# Patient Record
Sex: MALE | Race: WHITE | NOT HISPANIC OR LATINO | Employment: OTHER | URBAN - METROPOLITAN AREA
[De-identification: names, ages, dates, MRNs, and addresses within clinical notes are randomized per-mention and may not be internally consistent; named-entity substitution may affect disease eponyms.]

---

## 2018-12-30 ENCOUNTER — OFFICE VISIT - HEALTHEAST (OUTPATIENT)
Dept: FAMILY MEDICINE | Facility: CLINIC | Age: 33
End: 2018-12-30

## 2018-12-30 DIAGNOSIS — J20.9 ACUTE BRONCHITIS WITH SYMPTOMS > 10 DAYS: ICD-10-CM

## 2018-12-30 DIAGNOSIS — R09.81 NASAL CONGESTION: ICD-10-CM

## 2018-12-30 DIAGNOSIS — J06.9 UPPER RESPIRATORY TRACT INFECTION, UNSPECIFIED TYPE: ICD-10-CM

## 2019-07-02 ENCOUNTER — OFFICE VISIT - HEALTHEAST (OUTPATIENT)
Dept: FAMILY MEDICINE | Facility: CLINIC | Age: 34
End: 2019-07-02

## 2019-07-02 ENCOUNTER — COMMUNICATION - HEALTHEAST (OUTPATIENT)
Dept: TELEHEALTH | Facility: CLINIC | Age: 34
End: 2019-07-02

## 2019-07-02 DIAGNOSIS — R07.81 RIB PAIN ON RIGHT SIDE: ICD-10-CM

## 2019-07-02 DIAGNOSIS — M94.0 COSTOCHONDRITIS: ICD-10-CM

## 2019-11-04 ENCOUNTER — HEALTH MAINTENANCE LETTER (OUTPATIENT)
Age: 34
End: 2019-11-04

## 2020-02-21 ENCOUNTER — OFFICE VISIT (OUTPATIENT)
Dept: URGENT CARE | Facility: URGENT CARE | Age: 35
End: 2020-02-21

## 2020-02-21 VITALS
TEMPERATURE: 97.6 F | RESPIRATION RATE: 18 BRPM | SYSTOLIC BLOOD PRESSURE: 132 MMHG | HEART RATE: 81 BPM | OXYGEN SATURATION: 97 % | DIASTOLIC BLOOD PRESSURE: 88 MMHG

## 2020-02-21 DIAGNOSIS — R42 DIZZINESS: Primary | ICD-10-CM

## 2020-02-22 NOTE — PROGRESS NOTES
Don presents with complaints of feeling dizzy and anxious on and off since Sunday.  Patient states he had drank heavy on Saturday and has had a few drinks since then, most recently yesterday -- 2 beers and one mixed drink.  Patient denies drug use and states he currently is dizzy.  Patient's aunt is present and can drive him to an ED.  He is hesitant as he does not have insurance.  He has declined an EKG here.

## 2020-03-12 ENCOUNTER — OFFICE VISIT - HEALTHEAST (OUTPATIENT)
Dept: INTERNAL MEDICINE | Facility: CLINIC | Age: 35
End: 2020-03-12

## 2020-03-12 ENCOUNTER — COMMUNICATION - HEALTHEAST (OUTPATIENT)
Dept: TELEHEALTH | Facility: CLINIC | Age: 35
End: 2020-03-12

## 2020-03-12 ENCOUNTER — COMMUNICATION - HEALTHEAST (OUTPATIENT)
Dept: INTERNAL MEDICINE | Facility: CLINIC | Age: 35
End: 2020-03-12

## 2020-03-12 DIAGNOSIS — Z00.00 ROUTINE GENERAL MEDICAL EXAMINATION AT A HEALTH CARE FACILITY: ICD-10-CM

## 2020-03-12 DIAGNOSIS — G47.9 SLEEP DISTURBANCE: ICD-10-CM

## 2020-03-12 DIAGNOSIS — R00.2 PALPITATIONS: ICD-10-CM

## 2020-03-12 DIAGNOSIS — F41.9 ANXIETY: ICD-10-CM

## 2020-03-12 DIAGNOSIS — K21.9 GASTROESOPHAGEAL REFLUX DISEASE WITHOUT ESOPHAGITIS: ICD-10-CM

## 2020-03-12 DIAGNOSIS — F10.10 EXCESSIVE DRINKING ALCOHOL: ICD-10-CM

## 2020-03-12 LAB
ALBUMIN SERPL-MCNC: 4.1 G/DL (ref 3.5–5)
ALP SERPL-CCNC: 51 U/L (ref 45–120)
ALT SERPL W P-5'-P-CCNC: 43 U/L (ref 0–45)
ANION GAP SERPL CALCULATED.3IONS-SCNC: 9 MMOL/L (ref 5–18)
AST SERPL W P-5'-P-CCNC: 38 U/L (ref 0–40)
ATRIAL RATE - MUSE: 57 BPM
BASOPHILS # BLD AUTO: 0 THOU/UL (ref 0–0.2)
BASOPHILS NFR BLD AUTO: 0 % (ref 0–2)
BILIRUB SERPL-MCNC: 0.7 MG/DL (ref 0–1)
BUN SERPL-MCNC: 15 MG/DL (ref 8–22)
CALCIUM SERPL-MCNC: 9.8 MG/DL (ref 8.5–10.5)
CHLORIDE BLD-SCNC: 103 MMOL/L (ref 98–107)
CHOLEST SERPL-MCNC: 164 MG/DL
CO2 SERPL-SCNC: 26 MMOL/L (ref 22–31)
CREAT SERPL-MCNC: 0.86 MG/DL (ref 0.7–1.3)
DIASTOLIC BLOOD PRESSURE - MUSE: NORMAL
EOSINOPHIL # BLD AUTO: 0.2 THOU/UL (ref 0–0.4)
EOSINOPHIL NFR BLD AUTO: 4 % (ref 0–6)
ERYTHROCYTE [DISTWIDTH] IN BLOOD BY AUTOMATED COUNT: 12.5 % (ref 11–14.5)
FASTING STATUS PATIENT QL REPORTED: YES
GFR SERPL CREATININE-BSD FRML MDRD: >60 ML/MIN/1.73M2
GLUCOSE BLD-MCNC: 89 MG/DL (ref 70–125)
HCT VFR BLD AUTO: 44.6 % (ref 40–54)
HDLC SERPL-MCNC: 71 MG/DL
HGB BLD-MCNC: 15.3 G/DL (ref 14–18)
INTERPRETATION ECG - MUSE: NORMAL
LDLC SERPL CALC-MCNC: 73 MG/DL
LYMPHOCYTES # BLD AUTO: 1.8 THOU/UL (ref 0.8–4.4)
LYMPHOCYTES NFR BLD AUTO: 33 % (ref 20–40)
MCH RBC QN AUTO: 32 PG (ref 27–34)
MCHC RBC AUTO-ENTMCNC: 34.3 G/DL (ref 32–36)
MCV RBC AUTO: 93 FL (ref 80–100)
MONOCYTES # BLD AUTO: 0.5 THOU/UL (ref 0–0.9)
MONOCYTES NFR BLD AUTO: 8 % (ref 2–10)
NEUTROPHILS # BLD AUTO: 2.9 THOU/UL (ref 2–7.7)
NEUTROPHILS NFR BLD AUTO: 54 % (ref 50–70)
P AXIS - MUSE: 9 DEGREES
PLATELET # BLD AUTO: 315 THOU/UL (ref 140–440)
PMV BLD AUTO: 7 FL (ref 7–10)
POTASSIUM BLD-SCNC: 4.3 MMOL/L (ref 3.5–5)
PR INTERVAL - MUSE: 164 MS
PROT SERPL-MCNC: 7.1 G/DL (ref 6–8)
QRS DURATION - MUSE: 90 MS
QT - MUSE: 404 MS
QTC - MUSE: 393 MS
R AXIS - MUSE: 31 DEGREES
RBC # BLD AUTO: 4.78 MILL/UL (ref 4.4–6.2)
SODIUM SERPL-SCNC: 138 MMOL/L (ref 136–145)
SYSTOLIC BLOOD PRESSURE - MUSE: NORMAL
T AXIS - MUSE: 34 DEGREES
TRIGL SERPL-MCNC: 102 MG/DL
TSH SERPL DL<=0.005 MIU/L-ACNC: 1.26 UIU/ML (ref 0.3–5)
VENTRICULAR RATE- MUSE: 57 BPM
WBC: 5.3 THOU/UL (ref 4–11)

## 2020-03-12 ASSESSMENT — ANXIETY QUESTIONNAIRES
1. FEELING NERVOUS, ANXIOUS, OR ON EDGE: SEVERAL DAYS
3. WORRYING TOO MUCH ABOUT DIFFERENT THINGS: SEVERAL DAYS
GAD7 TOTAL SCORE: 7
4. TROUBLE RELAXING: MORE THAN HALF THE DAYS
2. NOT BEING ABLE TO STOP OR CONTROL WORRYING: SEVERAL DAYS
IF YOU CHECKED OFF ANY PROBLEMS ON THIS QUESTIONNAIRE, HOW DIFFICULT HAVE THESE PROBLEMS MADE IT FOR YOU TO DO YOUR WORK, TAKE CARE OF THINGS AT HOME, OR GET ALONG WITH OTHER PEOPLE: SOMEWHAT DIFFICULT
5. BEING SO RESTLESS THAT IT IS HARD TO SIT STILL: SEVERAL DAYS
6. BECOMING EASILY ANNOYED OR IRRITABLE: NOT AT ALL
7. FEELING AFRAID AS IF SOMETHING AWFUL MIGHT HAPPEN: SEVERAL DAYS

## 2020-03-12 ASSESSMENT — MIFFLIN-ST. JEOR: SCORE: 1916.7

## 2020-03-12 ASSESSMENT — PATIENT HEALTH QUESTIONNAIRE - PHQ9: SUM OF ALL RESPONSES TO PHQ QUESTIONS 1-9: 9

## 2020-07-13 ENCOUNTER — VIRTUAL VISIT (OUTPATIENT)
Dept: FAMILY MEDICINE | Facility: OTHER | Age: 35
End: 2020-07-13
Payer: COMMERCIAL

## 2020-07-13 PROCEDURE — 99421 OL DIG E/M SVC 5-10 MIN: CPT | Performed by: FAMILY MEDICINE

## 2020-07-13 NOTE — PROGRESS NOTES
"Date: 2020 16:45:51  Clinician: Dejuan Guy  Clinician NPI: [id number]  Patient: ODILIA LACKEY  Patient : 1985  Patient Address: [facility] [address] [address]  Patient Phone: [phone]  Visit Protocol: URI  Patient Summary:  ODILIA is a 35 year old ( : 1985 ) male who initiated a Visit for COVID-19 (Coronavirus) evaluation and screening. When asked the question \"Please sign me up to receive news, health information and promotions from [employer].\", ODILIA responded \"No\".    ODILIA states his symptoms started 1-2 days ago.   His symptoms consist of wheezing, ageusia, rhinitis, malaise, a headache, chills, a sore throat, myalgia, anosmia, a cough, and nasal congestion. He is experiencing mild difficulty breathing with activities but can speak normally in full sentences. ODILIA also feels feverish but was unable to measure his temperature.   Symptom details     Nasal secretions: The color of his mucus is white, green, and yellow.    Cough: ODILIA coughs every 5-10 minutes and his cough is more bothersome at night. Phlegm comes into his throat when he coughs. He does not believe his cough is caused by post-nasal drip. The color of the phlegm is white, green, and yellow.     Sore throat: ODILIA reports having mild throat pain (1-3 on a 10 point pain scale), does not have exudate on his tonsils, and can swallow liquids. He is not sure if the lymph nodes in his neck are enlarged. A rash has not appeared on the skin since the sore throat started.     Wheezing: ODILIA has not ever been diagnosed with asthma. Additional wheezing details as reported by the patient (free text): Wheezing during normal resting activities.       Headache: He states the headache is moderate (4-6 on a 10 point pain scale).      ODILIA denies having nausea, teeth pain, diarrhea, vomiting, ear pain, and facial pain or pressure. He also denies having recent facial or sinus surgery in the past 60 days, taking antibiotic medication in the past " month, and having a sinus infection within the past year.   Precipitating events  ODILIA is not sure if he has been exposed to someone with strep throat. He has recently been exposed to someone with influenza. ODILIA has not been in close contact with any high risk individuals.   Pertinent COVID-19 (Coronavirus) information  In the past 14 days, ODILIA has not worked in a congregate living setting.   He does not work or volunteer as healthcare worker or a  and does not work or volunteer in a healthcare facility.   ODILIA also has not lived in a congregate living setting in the past 14 days. He lives with a healthcare worker.   ODILIA has not had a close contact with a laboratory-confirmed COVID-19 patient within 14 days of symptom onset.   Pertinent medical history  ODILIA does not need a return to work/school note.   Weight: 190 lbs   ODILIA does not smoke or use smokeless tobacco.   Additional information as reported by the patient (free text): I've been around large groups of people.   Weight: 190 lbs    MEDICATIONS: No current medications, ALLERGIES: NKDA  Clinician Response:  Dear ODILIA,   Your symptoms show that you may have coronavirus (COVID-19). This illness can cause fever, cough and trouble breathing. Many people get a mild case and get better on their own. Some people can get very sick.  What should I do?  We would like to test you for this virus.   1. Please call [phone] to schedule your visit. Explain that you were referred by [facility] to have a COVID-19 test. Be ready to share your [facility] visit ID number.  The following will serve as your written order for this COVID Test, ordered by me, for the indication of suspected COVID [Z20.828]: The test will be ordered in [employer], our electronic health record, after you are scheduled. It will show as ordered and authorized by Humble Cho MD.  Order: COVID-19 (Coronavirus) PCR for SYMPTOMATIC testing from [facility].      2. When it's time for your COVID test:   "Stay at least 6 feet away from others. (If someone will drive you to your test, stay in the backseat, as far away from the  as you can.)   Cover your mouth and nose with a mask, tissue or washcloth.  Go straight to the testing site. Don't make any stops on the way there or back.      3.Starting now: Stay home and away from others (self-isolate) until:   You've had no fever---and no medicine that reduces fever---for 3 full days (72 hours). And...   Your other symptoms have gotten better. For example, your cough or breathing has improved. And...   At least 10 days have passed since your symptoms started.       During this time, don't leave the house except for testing or medical care.   Stay in your own room, even for meals. Use your own bathroom if you can.   Stay away from others in your home. No hugging, kissing or shaking hands. No visitors.  Don't go to work, school or anywhere else.    Clean \"high touch\" surfaces often (doorknobs, counters, handles, etc.). Use a household cleaning spray or wipes. You'll find a full list of  on the EPA website: www.epa.gov/pesticide-registration/list-n-disinfectants-use-against-sars-cov-2.   Cover your mouth and nose with a mask, tissue or washcloth to avoid spreading germs.  Wash your hands and face often. Use soap and water.  Caregivers in these groups are at risk for severe illness due to COVID-19:  o People 65 years and older  o People who live in a nursing home or long-term care facility  o People with chronic disease (lung, heart, cancer, diabetes, kidney, liver, immunologic)  o People who have a weakened immune system, including those who:   Are in cancer treatment  Take medicine that weakens the immune system, such as corticosteroids  Had a bone marrow or organ transplant  Have an immune deficiency  Have poorly controlled HIV or AIDS  Are obese (body mass index of 40 or higher)  Smoke regularly   o Caregivers should wear gloves while washing dishes, handling " laundry and cleaning bedrooms and bathrooms.  o Use caution when washing and drying laundry: Don't shake dirty laundry, and use the warmest water setting that you can.  o For more tips, go to www.cdc.gov/coronavirus/2019-ncov/downloads/10Things.pdf.    4.Sign up for [employer]. We know it's scary to hear that you might have COVID-19. We want to track your symptoms to make sure you're okay over the next 2 weeks. Please look for an email from [employer]---this is a free, online program that we'll use to keep in touch. To sign up, follow the link in the email. Learn more at [email or web address]  How can I take care of myself?   Get lots of rest. Drink extra fluids (unless a doctor has told you not to).   Take Tylenol (acetaminophen) for fever or pain. If you have liver or kidney problems, ask your family doctor if it's okay to take Tylenol.   Adults can take either:    650 mg (two 325 mg pills) every 4 to 6 hours, or...   1,000 mg (two 500 mg pills) every 8 hours as needed.    Note: Don't take more than 3,000 mg in one day. Acetaminophen is found in many medicines (both prescribed and over-the-counter medicines). Read all labels to be sure you don't take too much.   For children, check the Tylenol bottle for the right dose. The dose is based on the child's age or weight.    If you have other health problems (like cancer, heart failure, an organ transplant or severe kidney disease): Call your specialty clinic if you don't feel better in the next 2 days.       Know when to call 911. Emergency warning signs include:    Trouble breathing or shortness of breath Pain or pressure in the chest that doesn't go away Feeling confused like you haven't felt before, or not being able to wake up Bluish-colored lips or face.  Where can I get more information?    [facility] -- About COVID-19: [email or web address]   CDC -- What to Do If You're Sick:  www.cdc.gov/coronavirus/2019-ncov/about/steps-when-sick.html   CDC -- Ending Home Isolation: www.cdc.gov/coronavirus/2019-ncov/hcp/disposition-in-home-patients.html   [facility] -- Caring for Someone: www.cdc.gov/coronavirus/2019-ncov/if-you-are-sick/care-for-someone.html   [facility] -- Interim Guidance for Hospital Discharge to Home: www.[facility].[place]./diseases/coronavirus/hcp/hospdischarge.pdf   [facility] clinical trials (COVID-19 research studies): clinicalaffairs.[facility]-clinical-trials    Below are the COVID-19 hotlines at the [place] Department of Health ([facility]). Interpreters are available.    For health questions: Call 834-491-6444 or 1-425.867.7760 (7 a.m. to 7 p.m.) For questions about schools and childcare: Call 008-071-7638 or 1-607.692.7472 (7 a.m. to 7 p.m.)    Diagnosis: Cough  Diagnosis ICD: R05

## 2020-07-22 DIAGNOSIS — Z20.822 COVID-19 RULED OUT: Primary | ICD-10-CM

## 2020-07-22 PROCEDURE — U0003 INFECTIOUS AGENT DETECTION BY NUCLEIC ACID (DNA OR RNA); SEVERE ACUTE RESPIRATORY SYNDROME CORONAVIRUS 2 (SARS-COV-2) (CORONAVIRUS DISEASE [COVID-19]), AMPLIFIED PROBE TECHNIQUE, MAKING USE OF HIGH THROUGHPUT TECHNOLOGIES AS DESCRIBED BY CMS-2020-01-R: HCPCS | Performed by: FAMILY MEDICINE

## 2020-07-23 LAB
SARS-COV-2 RNA SPEC QL NAA+PROBE: NOT DETECTED
SPECIMEN SOURCE: NORMAL

## 2020-10-02 ENCOUNTER — COMMUNICATION - HEALTHEAST (OUTPATIENT)
Dept: INTERNAL MEDICINE | Facility: CLINIC | Age: 35
End: 2020-10-02

## 2020-10-05 ENCOUNTER — OFFICE VISIT - HEALTHEAST (OUTPATIENT)
Dept: FAMILY MEDICINE | Facility: CLINIC | Age: 35
End: 2020-10-05

## 2020-10-05 DIAGNOSIS — M79.605 PAIN IN LEFT LEG: ICD-10-CM

## 2020-10-05 DIAGNOSIS — M79.602 LEFT ARM PAIN: ICD-10-CM

## 2020-10-05 LAB
ALBUMIN SERPL-MCNC: 4.3 G/DL (ref 3.5–5)
ALP SERPL-CCNC: 51 U/L (ref 45–120)
ALT SERPL W P-5'-P-CCNC: 19 U/L (ref 0–45)
ANION GAP SERPL CALCULATED.3IONS-SCNC: 11 MMOL/L (ref 5–18)
AST SERPL W P-5'-P-CCNC: 17 U/L (ref 0–40)
BILIRUB SERPL-MCNC: 0.7 MG/DL (ref 0–1)
BUN SERPL-MCNC: 20 MG/DL (ref 8–22)
C REACTIVE PROTEIN LHE: <0.1 MG/DL (ref 0–0.8)
CALCIUM SERPL-MCNC: 9.6 MG/DL (ref 8.5–10.5)
CHLORIDE BLD-SCNC: 107 MMOL/L (ref 98–107)
CO2 SERPL-SCNC: 22 MMOL/L (ref 22–31)
CREAT SERPL-MCNC: 0.94 MG/DL (ref 0.7–1.3)
ERYTHROCYTE [DISTWIDTH] IN BLOOD BY AUTOMATED COUNT: 11.8 % (ref 11–14.5)
ERYTHROCYTE [SEDIMENTATION RATE] IN BLOOD BY WESTERGREN METHOD: 3 MM/HR (ref 0–15)
GFR SERPL CREATININE-BSD FRML MDRD: >60 ML/MIN/1.73M2
GLUCOSE BLD-MCNC: 100 MG/DL (ref 70–125)
HCT VFR BLD AUTO: 46.8 % (ref 40–54)
HGB BLD-MCNC: 15.5 G/DL (ref 14–18)
MCH RBC QN AUTO: 31.6 PG (ref 27–34)
MCHC RBC AUTO-ENTMCNC: 33.2 G/DL (ref 32–36)
MCV RBC AUTO: 95 FL (ref 80–100)
PLATELET # BLD AUTO: 299 THOU/UL (ref 140–440)
PMV BLD AUTO: 7.3 FL (ref 7–10)
POTASSIUM BLD-SCNC: 4.3 MMOL/L (ref 3.5–5)
PROT SERPL-MCNC: 7 G/DL (ref 6–8)
RBC # BLD AUTO: 4.92 MILL/UL (ref 4.4–6.2)
RHEUMATOID FACT SERPL-ACNC: <15 IU/ML (ref 0–30)
SODIUM SERPL-SCNC: 140 MMOL/L (ref 136–145)
WBC: 5.4 THOU/UL (ref 4–11)

## 2020-10-06 ENCOUNTER — COMMUNICATION - HEALTHEAST (OUTPATIENT)
Dept: FAMILY MEDICINE | Facility: CLINIC | Age: 35
End: 2020-10-06

## 2020-10-06 ENCOUNTER — AMBULATORY - HEALTHEAST (OUTPATIENT)
Dept: FAMILY MEDICINE | Facility: CLINIC | Age: 35
End: 2020-10-06

## 2020-10-06 DIAGNOSIS — M79.605 PAIN IN LEFT LEG: ICD-10-CM

## 2020-10-06 DIAGNOSIS — M79.602 LEFT ARM PAIN: ICD-10-CM

## 2020-10-06 LAB
25(OH)D3 SERPL-MCNC: 20.6 NG/ML (ref 30–80)
B BURGDOR IGG+IGM SER QL: 0.29 INDEX VALUE

## 2020-11-02 ENCOUNTER — RECORDS - HEALTHEAST (OUTPATIENT)
Dept: INTERNAL MEDICINE | Facility: CLINIC | Age: 35
End: 2020-11-02

## 2020-11-02 ENCOUNTER — COMMUNICATION - HEALTHEAST (OUTPATIENT)
Dept: FAMILY MEDICINE | Facility: CLINIC | Age: 35
End: 2020-11-02

## 2020-11-17 ENCOUNTER — OFFICE VISIT - HEALTHEAST (OUTPATIENT)
Dept: FAMILY MEDICINE | Facility: CLINIC | Age: 35
End: 2020-11-17

## 2020-11-17 DIAGNOSIS — M25.50 MULTIPLE JOINT PAIN: ICD-10-CM

## 2020-11-17 DIAGNOSIS — Z11.3 SCREEN FOR STD (SEXUALLY TRANSMITTED DISEASE): ICD-10-CM

## 2020-11-17 LAB
C REACTIVE PROTEIN LHE: <0.1 MG/DL (ref 0–0.8)
ERYTHROCYTE [SEDIMENTATION RATE] IN BLOOD BY WESTERGREN METHOD: 3 MM/HR (ref 0–15)
HIV 1+2 AB+HIV1 P24 AG SERPL QL IA: NEGATIVE
URATE SERPL-MCNC: 6.5 MG/DL (ref 3–8)

## 2020-11-18 LAB
B BURGDOR IGG+IGM SER QL: 0.33 INDEX VALUE
COVID-19 ANTIBODY IGG: NEGATIVE
HBV SURFACE AG SERPL QL IA: NEGATIVE
HCV AB SERPL QL IA: NEGATIVE

## 2020-11-19 LAB
A PHAGOCYTOPH IGG TITR SER IF: NORMAL {TITER}
A PHAGOCYTOPH IGM TITR SER IF: NORMAL {TITER}
B MICROTI IGG TITR SER: NORMAL {TITER}

## 2020-11-20 ENCOUNTER — COMMUNICATION - HEALTHEAST (OUTPATIENT)
Dept: FAMILY MEDICINE | Facility: CLINIC | Age: 35
End: 2020-11-20

## 2020-11-20 LAB
C TRACH DNA SPEC QL PROBE+SIG AMP: NEGATIVE
N GONORRHOEA DNA SPEC QL NAA+PROBE: NEGATIVE

## 2020-11-22 ENCOUNTER — HEALTH MAINTENANCE LETTER (OUTPATIENT)
Age: 35
End: 2020-11-22

## 2020-11-24 ENCOUNTER — OFFICE VISIT - HEALTHEAST (OUTPATIENT)
Dept: RHEUMATOLOGY | Facility: CLINIC | Age: 35
End: 2020-11-24

## 2020-11-24 DIAGNOSIS — G89.29 CHRONIC PAIN OF MULTIPLE JOINTS: ICD-10-CM

## 2020-11-24 DIAGNOSIS — M25.50 CHRONIC PAIN OF MULTIPLE JOINTS: ICD-10-CM

## 2020-11-24 DIAGNOSIS — G89.29 CHRONIC UPPER BACK PAIN: ICD-10-CM

## 2020-11-24 DIAGNOSIS — R20.2 PARESTHESIA OF UPPER EXTREMITY: ICD-10-CM

## 2020-11-24 DIAGNOSIS — M54.9 CHRONIC UPPER BACK PAIN: ICD-10-CM

## 2020-11-24 DIAGNOSIS — M25.512 CHRONIC LEFT SHOULDER PAIN: ICD-10-CM

## 2020-11-24 DIAGNOSIS — Z78.9 REGULAR ALCOHOL CONSUMPTION: ICD-10-CM

## 2020-11-24 DIAGNOSIS — G89.29 CHRONIC LEFT SHOULDER PAIN: ICD-10-CM

## 2020-12-01 ENCOUNTER — COMMUNICATION - HEALTHEAST (OUTPATIENT)
Dept: RHEUMATOLOGY | Facility: CLINIC | Age: 35
End: 2020-12-01

## 2021-05-18 ENCOUNTER — AMBULATORY - HEALTHEAST (OUTPATIENT)
Dept: LAB | Facility: CLINIC | Age: 36
End: 2021-05-18

## 2021-05-18 ENCOUNTER — RECORDS - HEALTHEAST (OUTPATIENT)
Dept: ADMINISTRATIVE | Facility: OTHER | Age: 36
End: 2021-05-18

## 2021-05-18 DIAGNOSIS — R20.2 PARESTHESIA OF UPPER EXTREMITY: ICD-10-CM

## 2021-05-18 DIAGNOSIS — R53.83 FATIGUE: ICD-10-CM

## 2021-05-18 DIAGNOSIS — M25.562 ARTHRALGIA OF BOTH KNEES: ICD-10-CM

## 2021-05-18 DIAGNOSIS — Z78.9 REGULAR ALCOHOL CONSUMPTION: ICD-10-CM

## 2021-05-18 DIAGNOSIS — M25.561 ARTHRALGIA OF BOTH KNEES: ICD-10-CM

## 2021-05-18 DIAGNOSIS — M25.50 CHRONIC PAIN OF MULTIPLE JOINTS: ICD-10-CM

## 2021-05-18 DIAGNOSIS — M54.9 CHRONIC UPPER BACK PAIN: ICD-10-CM

## 2021-05-18 DIAGNOSIS — G89.29 CHRONIC PAIN OF MULTIPLE JOINTS: ICD-10-CM

## 2021-05-18 DIAGNOSIS — G89.29 CHRONIC UPPER BACK PAIN: ICD-10-CM

## 2021-05-18 DIAGNOSIS — M79.10 MYALGIA: ICD-10-CM

## 2021-05-19 LAB
25(OH)D3 SERPL-MCNC: 35.6 NG/ML (ref 30–80)
B BURGDOR IGG+IGM SER QL: 0.3 INDEX VALUE

## 2021-05-20 ENCOUNTER — AMBULATORY - HEALTHEAST (OUTPATIENT)
Dept: NURSING | Facility: CLINIC | Age: 36
End: 2021-05-20

## 2021-05-20 LAB
EBV EA-D IGG SER-ACNC: <0.2 AI (ref 0–0.8)
EBV NA IGG SER IA-ACNC: >8 AI (ref 0–0.8)
EBV VCA IGG SER IA-ACNC: 2.3 AI (ref 0–0.8)

## 2021-05-25 ENCOUNTER — RECORDS - HEALTHEAST (OUTPATIENT)
Dept: ADMINISTRATIVE | Facility: CLINIC | Age: 36
End: 2021-05-25

## 2021-05-26 ASSESSMENT — PATIENT HEALTH QUESTIONNAIRE - PHQ9: SUM OF ALL RESPONSES TO PHQ QUESTIONS 1-9: 9

## 2021-05-28 ENCOUNTER — RECORDS - HEALTHEAST (OUTPATIENT)
Dept: ADMINISTRATIVE | Facility: CLINIC | Age: 36
End: 2021-05-28

## 2021-05-28 ASSESSMENT — ANXIETY QUESTIONNAIRES: GAD7 TOTAL SCORE: 7

## 2021-06-02 VITALS — BODY MASS INDEX: 24.37 KG/M2 | WEIGHT: 189.8 LBS

## 2021-06-03 VITALS — WEIGHT: 190 LBS | BODY MASS INDEX: 24.39 KG/M2

## 2021-06-04 VITALS
DIASTOLIC BLOOD PRESSURE: 79 MMHG | TEMPERATURE: 98 F | OXYGEN SATURATION: 97 % | BODY MASS INDEX: 24.92 KG/M2 | HEART RATE: 68 BPM | SYSTOLIC BLOOD PRESSURE: 128 MMHG | WEIGHT: 200.4 LBS | HEIGHT: 75 IN

## 2021-06-05 VITALS
HEART RATE: 61 BPM | SYSTOLIC BLOOD PRESSURE: 140 MMHG | WEIGHT: 196.8 LBS | DIASTOLIC BLOOD PRESSURE: 79 MMHG | BODY MASS INDEX: 24.93 KG/M2

## 2021-06-05 VITALS
HEART RATE: 71 BPM | DIASTOLIC BLOOD PRESSURE: 73 MMHG | WEIGHT: 195.9 LBS | BODY MASS INDEX: 24.82 KG/M2 | SYSTOLIC BLOOD PRESSURE: 116 MMHG

## 2021-06-06 NOTE — PROGRESS NOTES
Office Visit - New Patient   Doyle Beaver   35 y.o.  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  Issues are palpitations occurring on most days, symptoms suggestive of premature ventricular contractions which I believe are benign, without any red flag symptoms, has exacerbating factors of sleep disturbance, possibly excessive alcohol consumption.  Gastroesophageal reflux symptoms, without dysphagia.  Situational anxiety, life and career stressors, episodes of what sound like possible panic attacks.  Excessive alcohol consumption.  Sleep disturbance, related to anxiety and alcohol.  Perianal skin irritation and fissure.  General preventive care.    He is fasting this morning, so we will check comprehensive metabolic panel, blood cell counts, lipid profile, thyroid cascade.  Check 12-lead EKG.     Going issue by issue:    Palpitations occurring on most days, symptoms suggestive of premature ventricular contractions which I believe are benign, without any red flag symptoms, has exacerbating factors of sleep disturbance, possibly excessive alcohol consumption.  I told him that I am pretty sure what he is experiencing are benign PVCs, which I told him are not dangerous.  His cardiac physical exam is completely normal.  I told him that PVCs can be triggered and exacerbated by stress, sleep deprivation, dehydration or electrolyte disturbance, excessive caffeine, excessive alcohol.  We will get a baseline twelve-lead resting EKG today.    He asked about getting a 24-hour Holter monitor study.  I told him that we could do that, or he could try an attempt at lifestyle modification first, and see if his symptoms get better.  He agrees to hold off on the Holter monitor study for now.  I want him to reduce  and preferably completely stop alcohol consumption.  We need to address his anxiety and sleep problems, possibly using medication.  I want him to eat a healthy balanced diet, start regular exercise, which will also help with sleep.  He needs to continue avoiding caffeine.    LATER: His twelve-lead EKG is completely normal.    Gastroesophageal reflux symptoms, without dysphagia.  He describes quite characteristic gastroesophageal reflux symptoms, has bad habits in terms of eating late suppers and then lying down.  I told him he first needs to modify his habits, leaving at least 90 minutes after eating before lying down.  I told him he could also use an over-the-counter acid suppressor such as Prilosec, Zantac, or Pepcid.  I told him that those medications reduce acid production, but do not actually stop the reflux.  However, they can make him more comfortable, and if it helps his sleep, it is worth doing.    Situational anxiety, life and career stressors, episodes of what sound like possible panic attacks.  The first thing that he needs to do is stop the alcohol, because  alcohol interferes with sleep, and chronic sleep deprivation will make his anxiety symptoms worse. I told him that there are medication options we could consider for anxiety, such as the medication escitalopram (known as Lexapro).  I suggested he do some online research about those medications, and we can certainly start them, even without an office visit.  He would like to avoid medication for now.    Excessive alcohol consumption.      Sleep disturbance, related to anxiety and alcohol.  To improve sleep, I told him that regular exercise will help, also healthy diet, alcohol avoidance, caffeine avoidance, and he might also consider some mindfulness practices to help him relax before bedtime.  Examples are meditation, yoga, and a bedtime routine.  I reminded him that the bedroom environment needs to be cool, dark, and quiet.  Avoid screen time or  reading in bed.    Perianal skin irritation and fissure.  To care for the perianal skin, I suggested using moisten toilet paper or even baby wipes.  For bathing, use a mild soap such as Dove, pat the skin dry, could also use a hair dryer on air dry (no heat).  He does have a history of constipation and needs to maintain soft stools.  Hard stools can be irritating.    General preventive care.  We will include in his lab work today routine screening preventive tests, including lipid profile.  He can start colon cancer screening at age 50.  I asked him to check his immunization records.  He is an avid outdoorsman, and needs to keep his tetanus status current, meaning a booster every 10 years.         Chief Complaint   Chief Complaint   Patient presents with     Establish Care     Concerns regarding heart palpitations, blood in stool, acid reflux, anxiety        History of Present Illness   This 35 y.o. old  man who is historically been pretty healthy, works for a [employer] business, comes in to establish primary care, recently got his health insurance activated.  Issues are palpitations occurring on most days, symptoms suggestive of premature ventricular contractions which I believe are benign, without any red flag symptoms, has exacerbating factors of sleep disturbance, possibly excessive alcohol consumption.  Gastroesophageal reflux symptoms, without dysphagia.  Situational anxiety, life and career stressors, episodes of what sound like possible panic attacks.  Excessive alcohol consumption.  Sleep disturbance, related to anxiety and alcohol.  Perianal skin irritation and fissure.  General preventive care.    Works in [employer] business    Palpitations: feels a jump in chest, one jolt.  Feels a flush  Every day: between once to up to about 5 times in a day  Alcohol--- makes worse  Reduced cafine  No chest pain associated  No shortness of breath  No Fainting  No known history cardiac disease  Teenager tolf about  murmur    Chest pain-- GERD  Independent of palpitation  Sharp pains  Maybe related to meals  Worse after meals  Worse lying down  Swallowing is OK  Tried acid pills ago helped, not tried recently  Tends to eat late    Anxiety  Stressful couple of years, months  Breakup with girlfriend  Panic attacks-- hot flashes, can't sleep  Function on job OK, but struggling    EtOH every day 3 or 4 drinks-- beer whisky    Sleep terrible at least last month  Wakes up, can't go back to sleep  Caffeine he stopped few weeks ago  Alcohol--- makes worse  He denies excessive snoring or BERTHA symptoms    Yeast skin infection- perianal  January 2020  Antifungal cream helped  Hurts to BM  Itching has resolved  BMs are irregular.    Kayleigh 7-2-19  presents today complaining of 1 month history of right-sided rib pain.     03/05/2018 Office Visit [facility]    [address] [place] [address] [phone]   Dank Vivar MD    [address] [place] [address] [phone] (Fax)   Periumbilical abdominal pain (Primary Dx);   Constipation, unspecified constipation      There is no immunization history on file for this patient.    Wt Readings from Last 3 Encounters:   03/12/20 200 lb 6.4 oz (90.9 kg)   07/02/19 190 lb (86.2 kg)   12/30/18 189 lb 12.8 oz (86.1 kg)     BP Readings from Last 3 Encounters:   03/12/20 128/79   07/02/19 124/77   12/30/18 118/74       Lab Results   Component Value Date    WBC 7.4 10/30/2018    HGB 13.8 (L) 10/30/2018    HCT 41.0 10/30/2018     10/30/2018     10/30/2018    K 3.6 10/30/2018     10/30/2018    CREATININE 0.87 10/30/2018    BUN 17 10/30/2018    CO2 25 10/30/2018    TSH 2.90 10/30/2018       Review of Systems: A comprehensive review of systems was negative except as noted.     Medications and Allergies   No current outpatient medications on file.     No current facility-administered medications for this visit.      No Known  "Allergies     Active Ambulatory Problems     Diagnosis Date Noted     No Active Ambulatory Problems     Resolved Ambulatory Problems     Diagnosis Date Noted     No Resolved Ambulatory Problems     No Additional Past Medical History     No past surgical history on file.   No past medical history on file.     Family and Social History   Family History   Problem Relation Age of Onset     No Medical Problems Mother      No Medical Problems Father         Social History     Tobacco Use     Smoking status: Never Smoker     Smokeless tobacco: Never Used   Substance Use Topics     Alcohol use: Not on file     Drug use: Not on file        Physical Exam   General Appearance: Very pleasant, normal mental status, appropriate affect, mildly overweight, breathing comfortably, moves easily around exam room.    /79 (Patient Site: Right Arm, Patient Position: Sitting, Cuff Size: Adult Large)   Pulse 68   Temp 98  F (36.7  C) (Oral)   Ht 6' 2.5\" (1.892 m)   Wt 200 lb 6.4 oz (90.9 kg)   SpO2 97%   BMI 25.39 kg/m      General: Alert, in no distress  Skin: + Scattered benign-appearing moles, skin tags, and scattered acne pustules on his back  Eyes/nose/throat: Eyes without scleral icterus, eye movements normal, pupils equal and reactive, oropharynx clear, ears with normal TM's  MSK: Neck with good ROM  Lymphatic: Neck without adenopathy or masses  Endocrine: Thyroid with no nodules to palpation  Pulm: Lungs clear to auscultation bilaterally  Cardiac: Heart with regular rate and rhythm, no murmur or gallop.  No carotid bruits  GI: Abdomen soft, nontender. No palpable enlargement of liver or spleen  MSK: Extremities no tenderness or edema  Neuro: Moves all extremities, without focal weakness  Psych: Alert, normal mental status. Normal affect and speech  Perianal area: + Small anterior anal fissure, no significant hemorrhoids observed         Additional Information        Truman Cho MD  Internal Medicine        "

## 2021-06-06 NOTE — PATIENT INSTRUCTIONS - HE
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  Issues are palpitations occurring on most days, symptoms suggestive of premature ventricular contractions which I believe are benign, without any red flag symptoms, has exacerbating factors of sleep disturbance, possibly excessive alcohol consumption.  Gastroesophageal reflux symptoms, without dysphagia.  Situational anxiety, life and career stressors, episodes of what sound like possible panic attacks.  Excessive alcohol consumption.  Sleep disturbance, related to anxiety and alcohol.  Perianal skin irritation and fissure.  General preventive care.    He is fasting this morning, so we will check comprehensive metabolic panel, blood cell counts, lipid profile, thyroid cascade.  Check 12-lead EKG.     Going issue by issue:    Palpitations occurring on most days, symptoms suggestive of premature ventricular contractions which I believe are benign, without any red flag symptoms, has exacerbating factors of sleep disturbance, possibly excessive alcohol consumption.  I told him that I am pretty sure what he is experiencing are benign PVCs, which I told him are not dangerous.  His cardiac physical exam is completely normal.  I told him that PVCs can be triggered and exacerbated by stress, sleep deprivation, dehydration or electrolyte disturbance, excessive caffeine, excessive alcohol.  We will get a baseline twelve-lead resting EKG today.    He asked about getting a 24-hour Holter monitor study.  I told him that we could do that, or he could try an attempt at lifestyle modification first, and see if his symptoms get better.  He agrees to hold off on the Holter monitor study for now.  I want him to reduce and preferably completely stop alcohol consumption.  We need to address his anxiety and sleep problems, possibly using medication.  I want him to eat a  healthy balanced diet, start regular exercise, which will also help with sleep.  He needs to continue avoiding caffeine.    Gastroesophageal reflux symptoms, without dysphagia.  He describes quite characteristic gastroesophageal reflux symptoms, has bad habits in terms of eating late suppers and then lying down.  I told him he first needs to modify his habits, leaving at least 90 minutes after eating before lying down.  I told him he could also use an over-the-counter acid suppressor such as Prilosec, Zantac, or Pepcid.  I told him that those medications reduce acid production, but do not actually stop the reflux.  However, they can make him more comfortable, and if it helps his sleep, it is worth doing.    Situational anxiety, life and career stressors, episodes of what sound like possible panic attacks.  The first thing that he needs to do is stop the alcohol, because  alcohol interferes with sleep, and chronic sleep deprivation will make his anxiety symptoms worse. I told him that there are medication options we could consider for anxiety, such as the medication escitalopram (known as Lexapro).  I suggested he do some online research about those medications, and we can certainly start them, even without an office visit.  He would like to avoid medication for now.    Excessive alcohol consumption.      Sleep disturbance, related to anxiety and alcohol.  To improve sleep, I told him that regular exercise will help, also healthy diet, alcohol avoidance, caffeine avoidance, and he might also consider some mindfulness practices to help him relax before bedtime.  Examples are meditation, yoga, and a bedtime routine.  I reminded him that the bedroom environment needs to be cool, dark, and quiet.  Avoid screen time or reading in bed.    Perianal skin irritation and fissure.  To care for the perianal skin, I suggested using moisten toilet paper or even baby wipes.  For bathing, use a mild soap such as Dove, pat the skin  dry, could also use a hair dryer on air dry (no heat).  He does have a history of constipation and needs to maintain soft stools.  Hard stools can be irritating.    General preventive care.  We will include in his lab work today routine screening preventive tests, including lipid profile.  He can start colon cancer screening at age 50.  I asked him to check his immunization records.  He is an avid outdoorsman, and needs to keep his tetanus status current, meaning a booster every 10 years.

## 2021-06-10 ENCOUNTER — AMBULATORY - HEALTHEAST (OUTPATIENT)
Dept: NURSING | Facility: CLINIC | Age: 36
End: 2021-06-10

## 2021-06-12 NOTE — TELEPHONE ENCOUNTER
Looks like pt called back and was scheduled for next week   DANIELLE Douglas  10:54 AM ........ 10/2/2020

## 2021-06-12 NOTE — TELEPHONE ENCOUNTER
I do not know how to release this test on my chart, he did get a letter in the mail, once he is on my chart which it does not look like he is on yet then he will should be able to see his lab results.

## 2021-06-12 NOTE — TELEPHONE ENCOUNTER
Test Results  Who is calling?:  Patient   Who ordered the test:  Dr. Coyle  Type of test: Lab  Date of test:  10/5/2020  Where was the test performed:  [facility]  What are your questions/concerns?:  Requesting these results be released to his my chart account and all future test results asap  Okay to leave a detailed message?:  Yes

## 2021-06-12 NOTE — PROGRESS NOTES
ASSESSMENT:  1. Left arm pain  Patient with diffuse pain left shoulder into the left hand, multiple potential etiologies including overuse, tendinitis carpal tunnel cervical disc disease of note patient does have some scarring of the left third tendon.    - Comprehensive Metabolic Panel  - HM2(CBC w/o Differential)  - C-Reactive Protein  - Erythrocyte Sedimentation Rate  - Lyme Antibody Cascade  - Rheumatoid Factor Quant  - Vitamin D, Total (25-Hydroxy)    2. Pain in left leg  Also multiple potential etiologies including lumbar degenerative disc disease.  - Comprehensive Metabolic Panel  - HM2(CBC w/o Differential)  - C-Reactive Protein  - Erythrocyte Sedimentation Rate  - Lyme Antibody Cascade  - Rheumatoid Factor Quant  - Vitamin D, Total (25-Hydroxy)        PLAN:  1.  Laboratory studies as above.  2.  If the above laboratory studies are not diagnostic or revealing, future potential work-up could include left arm and her left leg EMG, rheumatologic referral.  3.  Otherwise, I am not specifically restricting the patient's activities at this time.       Orders Placed This Encounter   Procedures     Comprehensive Metabolic Panel     HM2(CBC w/o Differential)     C-Reactive Protein     Erythrocyte Sedimentation Rate     Lyme Antibody Cascade     Rheumatoid Factor Quant     Vitamin D, Total (25-Hydroxy)     There are no discontinued medications.    Return in about 4 weeks (around 11/2/2020) for recheck with PCP if symptoms persist/worsen.    CHIEF COMPLAINT:  Chief Complaint   Patient presents with     Shoulder Pain     R shoulder that hes noticed for 4-6 weeks.      Joint Pain     in various areas        SUBJECTIVE:  Doyle is a 35 y.o. male who comes in because for at least 4 to 6 weeks though perhaps longer he has been having some mainly left arm and left leg pain.  He does have a history of upper back neck and low back pain for which he has had chiropractic manipulation, on the other hand symptoms have clearly  worsened over the past 4 to 6 weeks, his chiropractor actually does not even want to see him until things are checked out so to speak.    Patient reports that his left arm hurts mainly from the left shoulder all the way into the hand does not localize to any 1 spot this was not preceded by any specific injury or change in his usual level of activity, but he reports that he is very active he is outdoors quite a bit he does a lot of writing and typing moving lifting in the like, so wonders if that could be a contributing factor.  He does not member any rash though he has removed ticks from his body previously.    Motrin definitely helps, but he is not having any other intervention at this time.    He also mentions that there is a bump dorsal aspect of the left hand, this is over the tendon going to the left third finger.    He also mentions some left leg pain as well.    Patient is concerned about the possibility of Lyme disease which is certainly a possibility, but I also discussed there are numerous rheumatologic conditions, overuse injuries there could be cervical and her lumbar degenerative changes carpal tunnel among other possibilities.    REVIEW OF SYSTEMS:   Patient reports he has not been sick, actually feels better when he is able to be more physically active.  All other systems are negative.    PFSH:  Immunization History   Administered Date(s) Administered     DTP 1985, 1985, 1985, 03/23/1987, 08/28/1990     HIB (HBOC) 04/28/1989     MMR 06/20/1986, 06/04/1997     OPV,Trivalent,Historic(5938-7229 only) 1985, 1985, 03/23/1987, 08/28/1990     Td, Adult, Absorbed 06/04/1997     Tdap 07/05/2009     Social History     Socioeconomic History     Marital status: Single     Spouse name: Not on file     Number of children: Not on file     Years of education: Not on file     Highest education level: Not on file   Occupational History     Not on file   Social Needs     Financial resource  strain: Not on file     Food insecurity     Worry: Not on file     Inability: Not on file     Transportation needs     Medical: Not on file     Non-medical: Not on file   Tobacco Use     Smoking status: Never Smoker     Smokeless tobacco: Never Used   Substance and Sexual Activity     Alcohol use: Yes     Frequency: 4 or more times a week     Drinks per session: 3 or 4     Drug use: Not on file     Sexual activity: Not on file   Lifestyle     Physical activity     Days per week: Not on file     Minutes per session: Not on file     Stress: Not on file   Relationships     Social connections     Talks on phone: Not on file     Gets together: Not on file     Attends [employer] service: Not on file     Active member of club or organization: Not on file     Attends meetings of clubs or organizations: Not on file     Relationship status: Not on file     Intimate partner violence     Fear of current or ex partner: Not on file     Emotionally abused: Not on file     Physically abused: Not on file     Forced sexual activity: Not on file   Other Topics Concern     Not on file   Social History Narrative     Not on file     No past medical history on file.  Family History   Problem Relation Age of Onset     No Medical Problems Mother      No Medical Problems Father        MEDICATIONS:  No current outpatient medications on file.     No current facility-administered medications for this visit.        TOBACCO USE:  Social History     Tobacco Use   Smoking Status Never Smoker   Smokeless Tobacco Never Used       VITALS:  Vitals:    10/05/20 0850   BP: 116/73   Pulse: 71   Weight: 195 lb 14.4 oz (88.9 kg)     Wt Readings from Last 3 Encounters:   10/05/20 195 lb 14.4 oz (88.9 kg)   03/12/20 200 lb 6.4 oz (90.9 kg)   07/02/19 190 lb (86.2 kg)       PHYSICAL EXAM:  Constitutional:   Reveals a healthy appearing male.  Vitals: per nursing notes.  HEENT:  Ears:  External canals, TMs clear.    Eyes:  EOMs full, PERRL.  Lungs: Clear to A&P  without rales or wheezes.  Respiratory effort normal.  Cardiac:   Regular rate and rhythm, normal S1, S2, no murmur or gallop.  Musculoskeletal: No peripheral swelling.  There is diffuse tenderness over the left shoulder but no swelling, the patient can get the left shoulder above his head but the motion is not as fluid is on the right, but the rotator cuff muscles are intact.  Patient does have some scarring over the left third tendon    Neuro:  Alert and oriented. Cranial nerves, motor, sensory exams are intact.  No gross focal deficits.  Psychiatric:  Memory intact, mood appropriate.    QUALITY MEASURES:      DATA REVIEWED:

## 2021-06-12 NOTE — TELEPHONE ENCOUNTER
New Appointment Needed  What is the reason for the visit:    Same Date/Next Day Appt Request  What is the reason for your visit?: joint body pain left side wants lymes test     Provider Preference: Any available  How soon do you need to be seen?: today  Waitlist offered?: No  Okay to leave a detailed message:  Call dropped.

## 2021-06-13 NOTE — PATIENT INSTRUCTIONS - HE
Summary of Your Rheumatology Visit    Next Appointment:  6-8  Months     Medications:      Referrals:    Orthopedics    Neurology    Tests:     Please have labs that were ordered performed.    Injections:        Other:

## 2021-06-13 NOTE — PROGRESS NOTES
ASSESSMENT:  1. Multiple joint pain  3 months of diffuse multiple joint aches, unclear etiology prior work-up is not showing any evidence of infection or inflammation.  - C-Reactive Protein  - Erythrocyte Sedimentation Rate  - Chlamydia trachomatis & Neisseria gonorrhoeae, Amplified Detection  - Hepatitis B Surface Antigen (HBsAG)  - HIV Antigen/Antibody Screening Cascade  - Hepatitis C Antibody (Anti-HCV)  - COVID-19 Virus (Coronavirus) Antibody Screen, IgG  - Anaplasma phagocytophilum (HGA) Antibodies, IgG and IgM  - Babesia microti Antibody IgG  - Lyme Antibody Monroe  - Uric Acid    2. Screen for STD (sexually transmitted disease)  Patient is currently asymptomatic no known exposure.  - C-Reactive Protein  - Erythrocyte Sedimentation Rate  - Chlamydia trachomatis & Neisseria gonorrhoeae, Amplified Detection  - Hepatitis B Surface Antigen (HBsAG)  - HIV Antigen/Antibody Screening Cascade  - Hepatitis C Antibody (Anti-HCV)        PLAN:  1.  Laboratory studies as above.  2.  The patient can continue to use over-the-counter Tylenol and/or Advil.  3.  Proceed according to the above results also the patient does have a initial evaluation by rheumatology next week.       Orders Placed This Encounter   Procedures     Chlamydia trachomatis & Neisseria gonorrhoeae, Amplified Detection     Order Specific Question:   Specimen Source?     Answer:   Urine     C-Reactive Protein     Erythrocyte Sedimentation Rate     Hepatitis B Surface Antigen (HBsAG)     HIV Antigen/Antibody Screening Monroe     Hepatitis C Antibody (Anti-HCV)     COVID-19 Virus (Coronavirus) Antibody Screen, IgG     Order Specific Question:   Healthcare worker?     Answer:   No     Order Specific Question:   If no     Answer:   Possible Exposure >14d ago     Anaplasma phagocytophilum (HGA) Antibodies, IgG and IgM     For Ehrlichia Antibody Panel also order [id number] Ehrlichia chaffeensis Antibodies, IgG & IgM by IFA.     Babesia microti Antibody IgG     Lyme  Antibody Cascade     Uric Acid     There are no discontinued medications.    No follow-ups on file.    CHIEF COMPLAINT:  Chief Complaint   Patient presents with     Follow-up     all over joint pain getting worse       SUBJECTIVE:  Doyle is a 35 y.o. male who comes in because of ongoing joint pain.  I saw the patient on October 5, for joint pain which had by that time been going on for perhaps 2 months, he reports now the joint pain is been going on for 3 months and is actually getting worse.  The pain is fairly diffuse shoulders elbows hands knees ankles.  He does not note any joint swelling.  The patient is worried about Lyme disease because he is out and about quite a bit and is pulled off a number of ticks.  He does not remember any rash similar to a Lyme disease rash.    When symptoms began in August he began fairly out of the blue and that there was no illness sickness change in his usual level of activity he was not sick at that time he has not been sick at all.    Patient is sexually active, he has had no symptoms nor is he aware of any partner who has had any symptoms.    There is a history of gout in his father but otherwise rheumatologic disorders do not run in his family.    Patient reports that he is never had joint pain like this.  He has started to take Advil and Tylenol which do seem to be helping at least somewhat.    I did place a referral to rheumatology he has a virtual appointment next week.    REVIEW OF SYSTEMS:      All other systems are negative.    PFS:  Immunization History   Administered Date(s) Administered     DTP 1985, 1985, 1985, 03/23/1987, 08/28/1990     HIB (HBOC) 04/28/1989     MMR 06/20/1986, 06/04/1997     OPV,Trivalent,Historic([id number] only) 1985, 1985, 03/23/1987, 08/28/1990     Td, Adult, Absorbed 06/04/1997     Tdap 07/05/2009     Social History     Socioeconomic History     Marital status: Single     Spouse name: Not on file     Number of  children: Not on file     Years of education: Not on file     Highest education level: Not on file   Occupational History     Not on file   Social Needs     Financial resource strain: Not on file     Food insecurity     Worry: Not on file     Inability: Not on file     Transportation needs     Medical: Not on file     Non-medical: Not on file   Tobacco Use     Smoking status: Never Smoker     Smokeless tobacco: Never Used   Substance and Sexual Activity     Alcohol use: Yes     Frequency: 4 or more times a week     Drinks per session: 3 or 4     Drug use: Not on file     Sexual activity: Not on file   Lifestyle     Physical activity     Days per week: Not on file     Minutes per session: Not on file     Stress: Not on file   Relationships     Social connections     Talks on phone: Not on file     Gets together: Not on file     Attends Denominational service: Not on file     Active member of club or organization: Not on file     Attends meetings of clubs or organizations: Not on file     Relationship status: Not on file     Intimate partner violence     Fear of current or ex partner: Not on file     Emotionally abused: Not on file     Physically abused: Not on file     Forced sexual activity: Not on file   Other Topics Concern     Not on file   Social History Narrative     Not on file     No past medical history on file.  Family History   Problem Relation Age of Onset     No Medical Problems Mother      No Medical Problems Father        MEDICATIONS:  No current outpatient medications on file.     No current facility-administered medications for this visit.        TOBACCO USE:  Social History     Tobacco Use   Smoking Status Never Smoker   Smokeless Tobacco Never Used       VITALS:  Vitals:    11/17/20 1545   BP: 140/79   Pulse: 61   Weight: 196 lb 12.8 oz (89.3 kg)     Wt Readings from Last 3 Encounters:   11/17/20 196 lb 12.8 oz (89.3 kg)   10/05/20 195 lb 14.4 oz (88.9 kg)   03/12/20 200 lb 6.4 oz (90.9 kg)        PHYSICAL EXAM:  Constitutional:   Reveals a male who overall looks healthy.  Vitals: per nursing notes.  Eyes:  EOMs full, PERRL.  Musculoskeletal: No peripheral swelling.  Neuro:  Alert and oriented. Cranial nerves, motor, sensory exams are intact.  No gross focal deficits.  Psychiatric:  Memory intact, mood appropriate.    QUALITY MEASURES:      DATA REVIEWED:

## 2021-06-13 NOTE — PROGRESS NOTES
Doyle Beaver who presents today with a chief complaint of  No chief complaint on file.      Joint Pains: Yes  Location: hands, elbows, ankle, and feet  Onset: Chronic  Intensity:  7/10  AM Stiffness: n/a  Alleviating/Aggravating Factors: Medications helpful? Yes, Tylenol.  Lack of activities increase pain  Tolerating Meds: Yes  Other:      ROS:  Patient denies having: persistent dry eyes, dry mouth, recurrent oral ulcers, patchy alopecia, active rashes, photosensitivity, history of psoriasis, active chest pain, active shortness of breath, active cough, active dysuria, history of kidney stones, active abdominal pain, active diarrhea, history of hematochezia, active dysphagia, history of peptic ulcer disease, history of HIV, tuberculosis, hepatitis B or C, Lyme disease, seizure history, raynaud's, active documented fevers, recent infections, difficulty sleeping + (3 months) or chronic unrefreshing sleep, involuntary weight loss, loss of appetite, excessive fatigue, depression, anxiety + (6 mons),  recurrent sinus infections, history of inflammatory eye diseases (such as uveitis, scleritis, iritis, etc).     [if complaining of low back pain low back stiffness in the morning and for how long]     [if complaining of limb weakness, which limbs?],     [if positive BOB: History of miscarriages? if yes, which trimester and how many?],     [If female over 60: recent bone density, fracture history],     [if complaining of headaches over temporal area: Jaw and scalp pain, temporal headaches, persistent double vision, loss of field of vision or painful vision]    [Denies having: Limb weakness, history of miscarriages, recent bone density, fracture history, frequent temporal headaches, jaw claudication, scalp tenderness, double vision loss of visual fields or painful vision].    Information gathered by medical assistant incorporated into this note, was reviewed and discussed with the patient.    Problem List:  Patient Active  Problem List   Diagnosis     Palpitations     Anxiety     Sleep disturbance     Excessive drinking alcohol     Gastroesophageal reflux disease without esophagitis        PMH:   No past medical history on file.    Surgical History:  No past surgical history on file.    Family History:  Family History   Problem Relation Age of Onset     No Medical Problems Mother      No Medical Problems Father        Social History:   reports that he has never smoked. He has never used smokeless tobacco. He reports current alcohol use.    Allergies:  No Known Allergies     Current Medications:  No current outpatient medications on file.     No current facility-administered medications for this visit.            Physical Exam:  Following up today via video visit, per Covid-19 pandemic requirements.    Verbal consent has been obtained for this service by care team member.    Video call start time: 5:07 PM    Video call end time:  5:42 PM    Stance utilized for video call.    Patient location for video visit: Home     Provider location for video visit:  Home (working remotely)        Summary/Assessment:    Pleasant 35-year-old male presents with chronic multiple joint pains and bilateral arm paresthesias.    Patient states that he was fine when in August began developing left shoulder pain upon awakening soon thereafter began experiencing multiple joint pains (hands, elbows, ankles, feet and mildly in knees).  Denies having any joint swelling.  Has not had any imaging performed over affected joints.  Describes pains as experiencing some spikes in joints.  Feels better with activity.  Denies any trauma.  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    Denies family history for autoimmune diseases.  Denies personal or family history for psoriasis.    Has about 3 beers per day.  Noted to have normal uric acid level.  Patient's father has history of gout (father drinks alcohol  beverages).    Takes Tylenol 1000 mg once to twice a day, sometimes 3 times a day.  Tylenol provides about 50% relief.    Has tried ibuprofen 800 mg twice a day, finds it less beneficial than Tylenol.    Typically does not have difficulty sleeping, has had some difficulty sleeping over the past 3 months since onset of symptoms.    Has been experiencing some anxiety over the past 6 months, currently not being managed.    Has had preliminary work-up that included some autoimmune markers and ESR/CRP levels which were within normal limits.    States he recently took a trip down south to [place] and [place] for 5 days and on day 3-5 once acclimated to the area with warmer weather had noticed minimal pains however when back in [place] with cold weather his original pains have resurfaced.    Currently on vitamin D for vitamin D deficiency.    Admits to having some upper back pains.     For over a year has also been experiencing bilateral arm paresthesias, denies having associated neck pain.  Has not seen neurologist.  Has not had EMG study.    Points to a small subcutaneous nodule involving the volar surface of left hand near third digit A1 pulley area.  Denies history of kidney stones.    It is difficult to tell at this time as to what is the primary etiology contributing to patient's symptoms described above.  Will obtain some additional autoimmune/inflammatory markers and correlate clinically.    Please see below for management plan.      Pertinent rheumatology/past medical history (please refer to above for more detailed history):      Chronic multiple joint pains (primarily left shoulder, hands, wrists, elbows, feet and lesser extent knees)    Bilateral arm paresthesias (> 1 year)    Chronic upper back pain    Regular alcohol consumption (3 beers per day)    Anxiety    Vitamin D deficiency      Rheumatology medications provided/suggested:          Pertinent medication from other providers or from otc (please refer  to above for more detailed med list):    Tylenol  CBD    Pertinent medications already tried:     Ibuprofen (800 mg, less effective than Tylenol)      Pertinent lab history:    Negative/unremarkable: BOB, rheumatoid factor, Lyme antibody, HIV, hepatitis serologies, uric acid, ESR, CRP, CBC, creatinine, liver enzymes.    11/2020, negative Covid IgG antibody      Pertinent imaging/test history:          Other:      Single, work: , likes to hunt, 3 drinks (beer) a day, none smoker, no recreational drug used.        Plan:       Continue Tylenol 500-1000 mg daily-twice daily, recommend he not exceed 2000 g daily given history of regular alcohol consumption.    Deferred trying a muscle laxer for aches, upper back pain and difficulty sleeping sleeping.    Deferred adding Cymbalta for component of anxiety and pains.    Deferred adding trying another NSAID for breakthrough pains.    Will refer to neurology for bilateral arm paresthesias.    Will refer to orthopedics for chronic left shoulder pain, states as opposed to other joints notices more pain at times with activity involving left shoulder.    We will obtain some additional labs and correlate clinically.    Follow-up in 6 to 8 weeks.    Procedure note:         This note was transcribed using Dragon voice recognition software as a result unintentional grammatical errors or word substitutions may have occurred. Please contact our Rheumatology department if you need any clarification or if you have any related inquiries.    Thank you for referring this patient to our clinic.      Benjamín Giles DO    ....................  11/24/2020   3:56 PM

## 2021-06-13 NOTE — TELEPHONE ENCOUNTER
[email or web address]    Please give pt a call to schedule his appt, Thank you!    Next Appointment:  6-8  Months      Medications:        Referrals:     Orthopedics     Neurology     Tests:      Please have labs that were ordered performed.

## 2021-06-17 NOTE — PATIENT INSTRUCTIONS - HE
Patient Instructions by Lebron Alcantar PA-C at 7/2/2019 12:20 PM     Author: Lebron Alcantar PA-C Service: -- Author Type: Physician Assistant    Filed: 7/2/2019  1:23 PM Encounter Date: 7/2/2019 Status: Addendum    : Lebron Alcantar PA-C (Physician Assistant)    Related Notes: Original Note by Lebron Alcantar PA-C (Physician Assistant) filed at 7/2/2019  1:23 PM       Use ice topically 3 times per day 20 minutes on and then take off to avoid damage to the skin.  You may use ibuprofen 800 mg (4 tablets) 3 times per day for analgesia and anti-inflammatory effect  Avoid any further trauma or injury to the rib cage.  Follow-up with your primary care provider if not getting good resolution of new symptoms or concerns arise.      Patient Education     Chest Wall Pain: Costochondritis    The chest pain that you have had today is caused by costochondritis. This condition is caused by an inflammation of the cartilage joining your ribs to your breastbone. It is not caused by heart or lung problems. Your healthcare team has made sure that the chest pain you feel is not from a life threatening cause of chest pain such as heart attack, collapsed lung, blood clot in the lung, tear in the aorta, or esophageal rupture. The inflammation may have been brought on by a blow to the chest, lifting heavy objects, intense exercise, or an illness that made you cough and sneeze a lot. It often occurs during times of emotional stress. It can be painful, but it is not dangerous. It usually goes away in 1 to 2 weeks. But it may happen again. Rarely, a more serious condition may cause symptoms similar to costochondritis. Thats why its important to watch for the warning signs listed below.  Home care  Follow these guidelines when caring for yourself at home:    If you feel that emotional stress is a cause of your condition, try to figure out the sources of that stress. It may not be obvious. Learn ways to deal with the stress in your life. This  can include regular exercise, muscle relaxation, meditation, or simply taking time out for yourself.    You may use acetaminophen, ibuprofen, or naproxen to control pain, unless another pain medicine was prescribed. If you have liver or kidney disease or ever had a stomach ulcer, talk with your healthcare provider before using these medicines.    You can also help ease pain by using a hot, wet compress or heating pad. Use this with or without a medicated skin cream that helps relieves pain.    Do stretching exercise as advised by your provider.    Take any prescribed medicines as directed.  Follow-up care  Follow up with your healthcare provider, or as advised, if you do not start to get better in the next 2 days.  When to seek medical advice  Call your healthcare provider right away if any of these occur:    A change in the type of pain. Call if it feels different, becomes more serious, lasts longer, or spreads into your shoulder, arm, neck, jaw, or back.    Shortness of breath or pain gets worse when you breathe    Weakness, dizziness, or fainting    Cough with dark-colored sputum (phlegm) or blood    Abdominal pain    Dark red or black stools    Fever of 100.4 F (38 C) or higher, or as directed by your healthcare provider  Date Last Reviewed: 12/1/2016 2000-2017 [employer]. [address] [place] [address], [place] [address]. All rights reserved. This information is not intended as a substitute for professional medical care. Always follow your healthcare professional's instructions.           Patient Education     Costochondritis    Costochondritis is inflammation of a rib or the cartilage that connects a rib to your breastbone (sternum). It causes tenderness, and sometimes chest pain may be sharp or aching, or it may feel like pressure. Pain may get worse with deep breathing, movement, or exercise. In some cases, the pain is mistaken for a heart attack. Despite this, the condition is not serious. Read on  to learn more about the condition and how it can be treated.  What causes costochondritis?  The cause of costochondritis is not completely clear, but it may happen after a chest injury, chest infection, or coughing episode. Some physical activities can sometimes lead to costochondritis. Large-breasted women may be more likely to have the condition. Often, the reason for the inflammation is unknown.  Diagnosing costochondritis  There is no test for costochondritis. The condition is diagnosed by the symptoms you have. Your healthcare provider will perform a physical exam. He or she will ask you about your symptoms and examine your chest for tenderness. In some cases, tests are done to rule out more serious problems. These tests may include imaging tests such as chest X-ray, CT scan, or an ECG.  Treating costochondritis  If an underlying cause is found, treatment for that will likely relieve the problem. Costochondritis often goes away on its own. The course of the condition varies from person to person. It usually lasts from weeks to months. In some cases, mild symptoms continue for months to years. To ease symptoms:    Take medicine as directed. These relieve pain and swelling. Ibuprofen or other NSAIDs are often recommended. In some cases, you may be given prescription medicine, such as muscle relaxants.    Avoid activities that put stress on the chest or spine.    Apply a heating pad (set to warm, not too high, heat) to the breastbone several times a day.    Perform stretching exercises as directed.  Call the healthcare provider right away if you have any of the following:    Pain that is not relieved by medicine    Shortness of breath    Lightheadedness, dizziness, or fainting    Feeling of irregular heartbeat or fast pulse  Anyone with chest pain should see a healthcare provider, especially those who are older and may be at risk for heart disease.   Date Last Reviewed: 10/1/2016    2355-2808 [employer],  [facility]. [address] [place] [address]. All rights reserved. This information is not intended as a substitute for professional medical care. Always follow your healthcare professional's instructions.

## 2021-06-20 NOTE — LETTER
Letter by Truman Cho MD at      Author: Truman Cho MD Service: -- Author Type: --    Filed:  Encounter Date: 3/12/2020 Status: (Other)         Doyle Beaver  [address] [place] [facility] [place] [address]             March 12, 2020         Dear Mr. Beaver,    Below are the results from your recent visit:    Resulted Orders   Comprehensive Metabolic Panel   Result Value Ref Range    Sodium 138 136 - 145 mmol/L    Potassium 4.3 3.5 - 5.0 mmol/L    Chloride 103 98 - 107 mmol/L    CO2 26 22 - 31 mmol/L    Anion Gap, Calculation 9 5 - 18 mmol/L    Glucose 89 70 - 125 mg/dL    BUN 15 8 - 22 mg/dL    Creatinine 0.86 0.70 - 1.30 mg/dL    GFR MDRD Af Amer >60 >60 mL/min/1.73m2    GFR MDRD Non Af Amer >60 >60 mL/min/1.73m2    Bilirubin, Total 0.7 0.0 - 1.0 mg/dL    Calcium 9.8 8.5 - 10.5 mg/dL    Protein, Total 7.1 6.0 - 8.0 g/dL    Albumin 4.1 3.5 - 5.0 g/dL    Alkaline Phosphatase 51 45 - 120 U/L    AST 38 0 - 40 U/L    ALT 43 0 - 45 U/L    Narrative    Fasting Glucose reference range is 70-99 mg/dL per  American Diabetes Association (ADA) guidelines.   Lipid Cascade   Result Value Ref Range    Cholesterol 164 <=199 mg/dL    Triglycerides 102 <=149 mg/dL    HDL Cholesterol 71 >=40 mg/dL    LDL Calculated 73 <=129 mg/dL    Patient Fasting > 8hrs? Yes    Thyroid Cascade   Result Value Ref Range    TSH 1.26 0.30 - 5.00 uIU/mL   HM1 (CBC with Diff)   Result Value Ref Range    WBC 5.3 4.0 - 11.0 thou/uL    RBC 4.78 4.40 - 6.20 mill/uL    Hemoglobin 15.3 14.0 - 18.0 g/dL    Hematocrit 44.6 40.0 - 54.0 %    MCV 93 80 - 100 fL    MCH 32.0 27.0 - 34.0 pg    MCHC 34.3 32.0 - 36.0 g/dL    RDW 12.5 11.0 - 14.5 %    Platelets 315 140 - 440 thou/uL    MPV 7.0 7.0 - 10.0 fL    Neutrophils % 54 50 - 70 %    Lymphocytes % 33 20 - 40 %    Monocytes % 8 2 - 10 %    Eosinophils % 4 0 - 6 %    Basophils % 0 0 - 2 %    Neutrophils Absolute 2.9 2.0 - 7.7 thou/uL    Lymphocytes Absolute 1.8 0.8 - 4.4 thou/uL    Monocytes Absolute 0.5 0.0 -  0.9 thou/uL    Eosinophils Absolute 0.2 0.0 - 0.4 thou/uL    Basophils Absolute 0.0 0.0 - 0.2 thou/uL       Enclosed are your test results from your recent clinic visit with me.  Your test results all look great.  That includes the comprehensive metabolic panel (includes test of liver and kidney function, blood sugar, lecture lites), cholesterol profile, blood cell counts, and thyroid test.     Please call with questions or contact us using [employer].    Sincerely,        Electronically signed by Truman Cho MD

## 2021-06-20 NOTE — LETTER
Letter by Justice Coyle MD at      Author: Justice Coyle MD Service: -- Author Type: --    Filed:  Encounter Date: 10/6/2020 Status: (Other)         Doyle HOFF Sd  [address] [place] [address]             October 6, 2020         Dear Mr. Beaver,    Below are the results from your recent visit: Overall the lab work done, only abnormality slightly low vitamin D, try taking 1 to 2000 units daily.  Test for Lyme disease is negative, blood counts are normal, markers of inflammation are normal test for rheumatoid arthritis is normal liver and kidney tests are normal.  As discussed I will place a referral in for you to see a rheumatologist.    Resulted Orders   Comprehensive Metabolic Panel   Result Value Ref Range    Sodium 140 136 - 145 mmol/L    Potassium 4.3 3.5 - 5.0 mmol/L    Chloride 107 98 - 107 mmol/L    CO2 22 22 - 31 mmol/L    Anion Gap, Calculation 11 5 - 18 mmol/L    Glucose 100 70 - 125 mg/dL    BUN 20 8 - 22 mg/dL    Creatinine 0.94 0.70 - 1.30 mg/dL    GFR MDRD Af Amer >60 >60 mL/min/1.73m2    GFR MDRD Non Af Amer >60 >60 mL/min/1.73m2    Bilirubin, Total 0.7 0.0 - 1.0 mg/dL    Calcium 9.6 8.5 - 10.5 mg/dL    Protein, Total 7.0 6.0 - 8.0 g/dL    Albumin 4.3 3.5 - 5.0 g/dL    Alkaline Phosphatase 51 45 - 120 U/L    AST 17 0 - 40 U/L    ALT 19 0 - 45 U/L    Narrative    Fasting Glucose reference range is 70-99 mg/dL per  American Diabetes Association (ADA) guidelines.   HM2(CBC w/o Differential)   Result Value Ref Range    WBC 5.4 4.0 - 11.0 thou/uL    RBC 4.92 4.40 - 6.20 mill/uL    Hemoglobin 15.5 14.0 - 18.0 g/dL    Hematocrit 46.8 40.0 - 54.0 %    MCV 95 80 - 100 fL    MCH 31.6 27.0 - 34.0 pg    MCHC 33.2 32.0 - 36.0 g/dL    RDW 11.8 11.0 - 14.5 %    Platelets 299 140 - 440 thou/uL    MPV 7.3 7.0 - 10.0 fL   C-Reactive Protein   Result Value Ref Range    CRP <0.1 0.0 - 0.8 mg/dL   Erythrocyte Sedimentation Rate   Result Value Ref Range    Sed Rate 3 0 - 15 mm/hr   Lyme Antibody Norton    Result Value Ref Range    Lyme Antibody Cascade 0.29 <0.90 Index Value    Narrative    Interpretation of Lyme Disease Total Antibody (IgG/IgM)  <0.90 Test Value=Negative  No detectable antibodies to B. burgdorferi. Patients  in early stages of infection may not produce  detectable levels of antibody. Antibiotic therapy  in early disease may prevent antibody production  from reaching detectable levels. Patients with  clinical history and/or symptoms suggestive of Lyme  disease but with negative test results should be  retested in 2-4 weeks.  0.90-<1.10 Test Value=Borderline  Suggests the presence of antibodies to B.  burgdorferi. Recommend repeat collection in 2-4  weeks.  >=1.10 Test Value=Positive  Indicates the presence of antibodies to B.  burgdorferi. False positive results can occur with  sera from syphilis patients. Cross-reactivity may  occur with relapsing fever, [place] Spotted  fever, other spirochetal diseases, erythematosus,  EBV infection, or CMV infection. Clinical symptoms,  epidemiology of the case and other laboratory tests  should allow for distinction of these conditions from  Lyme disease.   Rheumatoid Factor Quant   Result Value Ref Range    Rheumatoid Factor Quantitative <15.0 0 - 30 IU/mL   Vitamin D, Total (25-Hydroxy)   Result Value Ref Range    Vitamin D, Total (25-Hydroxy) 20.6 (L) 30.0 - 80.0 ng/mL    Narrative    Deficiency <10.0 ng/mL  Insufficiency 10.0-29.9 ng/mL  Sufficiency 30.0-80.0 ng/mL  Toxicity (possible) >100.0 ng/mL            Please call with questions or contact us using [employer].    Sincerely,        Electronically signed by Justice Coyle MD

## 2021-06-21 NOTE — LETTER
Letter by Justice Coyle MD at      Author: Justice Coyle MD Service: -- Author Type: --    Filed:  Encounter Date: 11/20/2020 Status: (Other)         Doyle HOFF Sd  [address] [place] [address]             November 20, 2020         Dear Mr. Beaver,    Below are the results from your recent visit: All the laboratory studies are again normal, all the tests for STDs are negative, I retested you for Lyme disease and other tickborne illnesses also negative.  Markers of inflammation are again normal.  I am not coming up with any cause or reason for your joint pain, await input from rheumatology.    Resulted Orders   C-Reactive Protein   Result Value Ref Range    CRP <0.1 0.0 - 0.8 mg/dL   Erythrocyte Sedimentation Rate   Result Value Ref Range    Sed Rate 3 0 - 15 mm/hr   Chlamydia trachomatis & Neisseria gonorrhoeae, Amplified Detection   Result Value Ref Range    Chlamydia trachomatis, Amplified Detection Negative Negative    Neisseria gonorrhoeae, Amplified Detection Negative Negative   Hepatitis B Surface Antigen (HBsAG)   Result Value Ref Range    Hepatitis B Surface Ag Negative Negative   HIV Antigen/Antibody Screening Cascade   Result Value Ref Range    HIV Antigen / Antibody Negative Negative    Narrative    Method is Abbott HIV Ag/Ab for the detection of HIV p24 antigen, HIV-1 antibodies and HIV-2 antibodies.   Hepatitis C Antibody (Anti-HCV)   Result Value Ref Range    Hepatitis C Ab Negative Negative   COVID-19 Virus (Coronavirus) Antibody Screen, IgG   Result Value Ref Range    COVID -19 Danuta IgG  Negative       Comment:      Negative results do not rule out SARS-CoV-2 infection, particularly in those who have been in contact with the virus. Follow-up testing with a molecular diagnostic should be considered to rule out infection in these individuals. Results from antibody testing should not be used as the sole basis to diagnose or exclude SARS-CoV-2 infection or to inform infection  status.  This automated Chemiluminescent microparticle immunoassay (CMIA) by [employer] on the  i2000 instrument has been given Emergency Use Authorization (EUA).The performance characteristics of this test were determined by [facility]. The laboratory is regulated under CLIA as qualified to perform high-complexity testing.This test is used for clinical purposes.It should not be regarded as investigational or for research.   Anaplasma phagocytophilum (HGA) Antibodies, IgG and IgM   Result Value Ref Range    A. Phagocytophilum Antibody, IgG <1:80 <1:80      Comment:      INTERPRETIVE INFORMATION: A. phagocytophilum (HGA) Antibody, IgG      Less than 1:80 - No significant level of IgG antibodies                     to A. phagocytophilum detected.    Greater than or    equal to 1:80  - Suggestive of a recent or past infection                     with A. phagocytophilum.    Test developed and characteristics determined by [facility]. See Compliance Statement B: [employer]/CS    A. Phagocytophilum Antibody, IgM < 1:16 <1:16      Comment:      INTERPRETIVE INFORMATION: A. phagocytophilum (HGA) Antibody, IgM      Less than 1:16 - No significant level of IgM antibodies                     to A. phagocytophilum detected.    Greater than or    equal to 1:16  - Suggestive of a current or recent                     infection with A. phagocytophilum.    Test developed and characteristics determined by [facility]. See Compliance Statement B: [employer]  Performed By: [facility]  [address] [place] [id number]  : Ankita Bernard MD   Babesia microti Antibody IgG   Result Value Ref Range    Babesia microti IgG < 1:16 <1:16      Comment:      INTERPRETIVE INFORMATION: Babesia microti Antibody, IgG      Less than 1:16 ........ Negative - No significant level                            of detectable Babesia IgG                             antibodies.    1:16 .................. Equivocal - Repeat testing in                            10-14 days may be helpful.    Greater than 1:16 ..... Positive - IgG antibodies to                            Babesia detected which may                            indicate a current or previous                            infection.    Test developed and characteristics determined by [facility]. See Compliance Statement A: [employer]/CS  Performed By: [facility]  [address] [place] [address]  : Ankita Bernard MD   Lyme Antibody Cascade   Result Value Ref Range    Lyme Antibody Cascade 0.33 <0.90 Index Value    Narrative    Interpretation of Lyme Disease Total Antibody (IgG/IgM)  <0.90 Test Value=Negative  No detectable antibodies to B. burgdorferi. Patients  in early stages of infection may not produce  detectable levels of antibody. Antibiotic therapy  in early disease may prevent antibody production  from reaching detectable levels. Patients with  clinical history and/or symptoms suggestive of Lyme  disease but with negative test results should be  retested in 2-4 weeks.  0.90-<1.10 Test Value=Borderline  Suggests the presence of antibodies to B.  burgdorferi. Recommend repeat collection in 2-4  weeks.  >=1.10 Test Value=Positive  Indicates the presence of antibodies to B.  burgdorferi. False positive results can occur with  sera from syphilis patients. Cross-reactivity may  occur with relapsing fever, [place] Spotted  fever, other spirochetal diseases, erythematosus,  EBV infection, or CMV infection. Clinical symptoms,  epidemiology of the case and other laboratory tests  should allow for distinction of these conditions from  Lyme disease.   Uric Acid   Result Value Ref Range    Uric Acid 6.5 3.0 - 8.0 mg/dL            Please call with questions or contact us using [employer].    Sincerely,        Electronically signed by Justice Coyle MD

## 2021-06-22 NOTE — PROGRESS NOTES
Assessment:     1. Acute bronchitis with symptoms > 10 days  benzonatate (TESSALON) 200 MG capsule    albuterol (PROAIR HFA;PROVENTIL HFA;VENTOLIN HFA) 90 mcg/actuation inhaler    amoxicillin-clavulanate (AUGMENTIN) 875-125 mg per tablet    DISCONTINUED: benzonatate (TESSALON) 200 MG capsule    DISCONTINUED: albuterol (PROAIR HFA;PROVENTIL HFA;VENTOLIN HFA) 90 mcg/actuation inhaler    DISCONTINUED: amoxicillin-clavulanate (AUGMENTIN) 875-125 mg per tablet   2. Nasal congestion     3. Upper respiratory tract infection, unspecified type            Plan:     Differential diagnosis include but not limited to sinus pressure, sinusitis, bronchitis, upper respiratory infection.  Discussed with the patient on exam finding, patient with sinus pressure.  I recommended for him to take over-the-counter decongestant like Sudafed, Claritin decongestant, or Zyrtec to relieve his symptoms.  Also for the sinus headache he can use ibuprofen or Tylenol as needed.  Given the duration of his symptoms, I will treat him for bacterial bronchitis with Tessalon Perles 200 mg 3 times daily times 10 days as needed, pro-air inhaler every 6 hours as needed for cough, or shortness of breath.  Augmentin twice daily times 10 days.  Patient is aware that if his symptoms are mostly viral the Augmentin would not be assistive for his pain.  Advised him to also increase fluid intake.  Monitor for worsening symptoms.  Follow-up with a PCP if symptoms does not resolve after treatment.  Patient verbalized understanding the plan of care.     Subjective:       33 y.o. male presents for evaluation of sinus pressure, headache, nasal drainage, and a cough.  Patient admits that he has been having a cough for about 1 month.  For the past few days he feels like he is run down, fatigue, lack of energy.  He also admits to having headaches, he has been using DayQuil which has not given him any symptom relief.  Last night he was coughing he felt like his voice was  luis alberto and he was having some wheezing in his lungs especially with deep breaths.  He has not had any fever, he has no shortness of breath, no vomiting or diarrhea.  Patient will be traveling out of town on Wednesday and previously he has been diagnosed with walking pneumonia.      The following portions of the patient's history were reviewed and updated as appropriate: allergies, current medications, past family history, past medical history, past social history, past surgical history and problem list.    Review of Systems  A 12 point comprehensive review of systems was negative except as noted.      Objective:      /74 (Patient Site: Right Arm, Patient Position: Sitting, Cuff Size: Adult Regular)   Pulse 60   Temp 98  F (36.7  C) (Oral)   Resp 16   Wt 189 lb 12.8 oz (86.1 kg)   SpO2 96%   General appearance: alert, appears stated age, cooperative and moderate distress  Head: Normocephalic, without obvious abnormality, atraumatic, sinuses nontender to percussion  Eyes: conjunctivae/corneas clear. PERRL, EOM's intact. Fundi benign.  Ears: normal TM's and external ear canals both ears  Nose: Nares normal. Septum midline. Mucosa normal. No drainage or sinus tenderness., severe congestion  Throat: abnormal findings: moderate oropharyngeal erythema and postnasal drainage  Lungs: rhonchi bilaterally and LLL  Heart: regular rate and rhythm, S1, S2 normal, no murmur, click, rub or gallop  Extremities: extremities normal, atraumatic, no cyanosis or edema  Pulses: 2+ and symmetric  Skin: Skin color, texture, turgor normal. No rashes or lesions  Lymph nodes: Cervical, supraclavicular, and axillary nodes normal.  Neurologic: Grossly normal     This note has been dictated using voice recognition software. Any grammatical or context distortions are unintentional and inherent to the software

## 2021-06-27 NOTE — PROGRESS NOTES
Progress Notes by Lebron Alcantar PA-C at 7/2/2019 12:20 PM     Author: Lebron Alcantar PA-C Service: -- Author Type: Physician Assistant    Filed: 7/2/2019  1:31 PM Encounter Date: 7/2/2019 Status: Signed    : Lebron Alcantar PA-C (Physician Assistant)       Subjective:      Patient ID: Doyle Beaver is a 34 y.o. male.    Chief Complaint:    HPI  Doyle Beaver is a 34 y.o. male who presents today complaining of 1 month history of right-sided rib pain.  States he went camping roughly a month ago and had pain while sleeping on the ground.  After he got up he continued to have irritation with the right lower rib cage.  He has not had difficulty with pleuritic chest pain, hemoptysis cough fever chills or night sweats no weight loss or other constitutional symptoms to report.  He denies any precipitating event or injury other than the laying on the ground while camping.  He has not tried treatment for this at home.    No past medical history on file.    No past surgical history on file.    No family history on file.    Social History     Tobacco Use   ? Smoking status: Never Smoker   ? Smokeless tobacco: Never Used   Substance Use Topics   ? Alcohol use: Not on file   ? Drug use: Not on file       Review of Systems  As above in HPI, otherwise balance of Review of Systems are negative.    Objective:     /77 (Patient Site: Right Arm, Patient Position: Sitting, Cuff Size: Adult Regular)   Pulse 63   Temp 98  F (36.7  C) (Oral)   Resp 17   Wt 190 lb (86.2 kg)   SpO2 98%     Physical Exam  General: Patient is resting comfortably no acute distress is afebrile  HEENT: Head is normocephalic atraumatic   eyes are PERRL EOMI sclera anicteric   No cervical lymphadenopathy present  LUNGS: Clear to auscultation bilaterally  Patient has pain over the right mid clavicular line in line with the costochondral junction.  On the eighth and ninth rib this does reproduce his symptoms to the point palpation.  HEART: Regular  rate and rhythm  Skin: Without rash non-diaphoretic    Imaging    Xr Ribs Right W Pa Chest    Result Date: 7/2/2019  EXAM: XR RIBS RIGHT W PA CHEST LOCATION: [facility] DATE/TIME: 7/2/2019 12:58 PM INDICATION: RIGHT MID AXILLRY LINE 8TH RIB PAIN COMPARISON: 10/13/2018 FINDINGS: Lungs are clear. No evidence of a hydropneumothorax. Heart and pulmonary vascularity are normal. Right rib detail films obtained with a marker. No fractures.      I personally reviewed and discussed findings with the patient.  My own personal interpretation and radiologist will over read x-rays    Assessment:     Procedures    The primary encounter diagnosis was Costochondritis. A diagnosis of Rib pain on right side was also pertinent to this visit.    Plan:     1. Costochondritis  XR Ribs Right W PA Chest   2. Rib pain on right side  XR Ribs Right W PA Chest       Advised patient that it is usually 4 to 6 weeks with soft tissue injuries to heal.  He is roughly at the four-week devonte.  He should get good resolution of the next 2 to 3 weeks with his soft tissue treatment of the costochondritis.  Would avoid further trauma injury fall or heavy lifting bending or twisting.  Indication for return was gone over questions were answered patient satisfaction before discharge.    Patient Instructions     Use ice topically 3 times per day 20 minutes on and then take off to avoid damage to the skin.  You may use ibuprofen 800 mg (4 tablets) 3 times per day for analgesia and anti-inflammatory effect  Avoid any further trauma or injury to the rib cage.  Follow-up with your primary care provider if not getting good resolution of new symptoms or concerns arise.      Patient Education     Chest Wall Pain: Costochondritis    The chest pain that you have had today is caused by costochondritis. This condition is caused by an inflammation of the cartilage joining your ribs to your breastbone. It is not caused by heart or lung problems. Your  healthcare team has made sure that the chest pain you feel is not from a life threatening cause of chest pain such as heart attack, collapsed lung, blood clot in the lung, tear in the aorta, or esophageal rupture. The inflammation may have been brought on by a blow to the chest, lifting heavy objects, intense exercise, or an illness that made you cough and sneeze a lot. It often occurs during times of emotional stress. It can be painful, but it is not dangerous. It usually goes away in 1 to 2 weeks. But it may happen again. Rarely, a more serious condition may cause symptoms similar to costochondritis. Thats why its important to watch for the warning signs listed below.  Home care  Follow these guidelines when caring for yourself at home:    If you feel that emotional stress is a cause of your condition, try to figure out the sources of that stress. It may not be obvious. Learn ways to deal with the stress in your life. This can include regular exercise, muscle relaxation, meditation, or simply taking time out for yourself.    You may use acetaminophen, ibuprofen, or naproxen to control pain, unless another pain medicine was prescribed. If you have liver or kidney disease or ever had a stomach ulcer, talk with your healthcare provider before using these medicines.    You can also help ease pain by using a hot, wet compress or heating pad. Use this with or without a medicated skin cream that helps relieves pain.    Do stretching exercise as advised by your provider.    Take any prescribed medicines as directed.  Follow-up care  Follow up with your healthcare provider, or as advised, if you do not start to get better in the next 2 days.  When to seek medical advice  Call your healthcare provider right away if any of these occur:    A change in the type of pain. Call if it feels different, becomes more serious, lasts longer, or spreads into your shoulder, arm, neck, jaw, or back.    Shortness of breath or pain gets  worse when you breathe    Weakness, dizziness, or fainting    Cough with dark-colored sputum (phlegm) or blood    Abdominal pain    Dark red or black stools    Fever of 100.4 F (38 C) or higher, or as directed by your healthcare provider  Date Last Reviewed: 12/1/2016 2000-2017 [employer]. [address] [place] [id number]. All rights reserved. This information is not intended as a substitute for professional medical care. Always follow your healthcare professional's instructions.           Patient Education     Costochondritis    Costochondritis is inflammation of a rib or the cartilage that connects a rib to your breastbone (sternum). It causes tenderness, and sometimes chest pain may be sharp or aching, or it may feel like pressure. Pain may get worse with deep breathing, movement, or exercise. In some cases, the pain is mistaken for a heart attack. Despite this, the condition is not serious. Read on to learn more about the condition and how it can be treated.  What causes costochondritis?  The cause of costochondritis is not completely clear, but it may happen after a chest injury, chest infection, or coughing episode. Some physical activities can sometimes lead to costochondritis. Large-breasted women may be more likely to have the condition. Often, the reason for the inflammation is unknown.  Diagnosing costochondritis  There is no test for costochondritis. The condition is diagnosed by the symptoms you have. Your healthcare provider will perform a physical exam. He or she will ask you about your symptoms and examine your chest for tenderness. In some cases, tests are done to rule out more serious problems. These tests may include imaging tests such as chest X-ray, CT scan, or an ECG.  Treating costochondritis  If an underlying cause is found, treatment for that will likely relieve the problem. Costochondritis often goes away on its own. The course of the condition varies from person  to person. It usually lasts from weeks to months. In some cases, mild symptoms continue for months to years. To ease symptoms:    Take medicine as directed. These relieve pain and swelling. Ibuprofen or other NSAIDs are often recommended. In some cases, you may be given prescription medicine, such as muscle relaxants.    Avoid activities that put stress on the chest or spine.    Apply a heating pad (set to warm, not too high, heat) to the breastbone several times a day.    Perform stretching exercises as directed.  Call the healthcare provider right away if you have any of the following:    Pain that is not relieved by medicine    Shortness of breath    Lightheadedness, dizziness, or fainting    Feeling of irregular heartbeat or fast pulse  Anyone with chest pain should see a healthcare provider, especially those who are older and may be at risk for heart disease.   Date Last Reviewed: 10/1/2016    2768-7841 [employer]. [address] [place] [address], [place] [address]. All rights reserved. This information is not intended as a substitute for professional medical care. Always follow your healthcare professional's instructions.

## 2021-07-04 NOTE — ADDENDUM NOTE
Addendum Note by Guillermo Boland CMA at 5/18/2021  4:00 PM     Author: Guillermo Boland CMA Service: -- Author Type: Certified Medical Assistant    Filed: 7/2/2021 11:37 AM Encounter Date: 5/18/2021 Status: Signed    : Guillermo Boland CMA (Certified Medical Assistant)    Addended by: GUILLERMO BOLAND on: 7/2/2021 11:37 AM        Modules accepted: Orders

## 2021-09-18 ENCOUNTER — HEALTH MAINTENANCE LETTER (OUTPATIENT)
Age: 36
End: 2021-09-18

## 2022-01-08 ENCOUNTER — HEALTH MAINTENANCE LETTER (OUTPATIENT)
Age: 37
End: 2022-01-08

## 2022-08-08 ENCOUNTER — OFFICE VISIT (OUTPATIENT)
Dept: FAMILY MEDICINE | Facility: CLINIC | Age: 37
End: 2022-08-08
Payer: COMMERCIAL

## 2022-08-08 ENCOUNTER — NURSE TRIAGE (OUTPATIENT)
Dept: NURSING | Facility: CLINIC | Age: 37
End: 2022-08-08

## 2022-08-08 VITALS
RESPIRATION RATE: 16 BRPM | DIASTOLIC BLOOD PRESSURE: 85 MMHG | HEART RATE: 49 BPM | SYSTOLIC BLOOD PRESSURE: 144 MMHG | WEIGHT: 195 LBS | BODY MASS INDEX: 24.7 KG/M2 | OXYGEN SATURATION: 99 % | TEMPERATURE: 97.8 F

## 2022-08-08 DIAGNOSIS — S61.412A LACERATION OF LEFT HAND WITHOUT FOREIGN BODY, INITIAL ENCOUNTER: Primary | ICD-10-CM

## 2022-08-08 PROCEDURE — 90471 IMMUNIZATION ADMIN: CPT | Performed by: PHYSICIAN ASSISTANT

## 2022-08-08 PROCEDURE — 90715 TDAP VACCINE 7 YRS/> IM: CPT | Performed by: PHYSICIAN ASSISTANT

## 2022-08-08 PROCEDURE — 99213 OFFICE O/P EST LOW 20 MIN: CPT | Mod: 25 | Performed by: PHYSICIAN ASSISTANT

## 2022-08-08 RX ORDER — CEPHALEXIN 500 MG/1
500 CAPSULE ORAL 3 TIMES DAILY
Qty: 15 CAPSULE | Refills: 0 | Status: SHIPPED | OUTPATIENT
Start: 2022-08-08 | End: 2022-08-13

## 2022-08-08 NOTE — TELEPHONE ENCOUNTER
Triage call  Patient Cut his  left hand palm near the thumb.  He did this on Friday and but the wound is not healing and he thinks he needs stiches to close the wound.  Explained they may not be able to use stiches since did this 3 days ago.  He stated it does not appear infected.    Per Protocol see in office today.  Care advice given.  Verbalizes understanding and agrees with plan.  Transferred to scheduling.    Lina Cheng RN   [facility] Nurse Advisor  12:27 PM 8/8/2022    COVID 19 Nurse Triage Plan/Patient Instructions    Please be aware that novel coronavirus (COVID-19) may be circulating in the community. If you develop symptoms such as fever, cough, or SOB or if you have concerns about the presence of another infection including coronavirus (COVID-19), please contact your health care provider or visit [email or web address].     Disposition/Instructions    In-Person Visit with provider recommended. Reference Visit Selection Guide.    Thank you for taking steps to prevent the spread of this virus.  o Limit your contact with others.  o Wear a simple mask to cover your cough.  o Wash your hands well and often.    Resources    [facility]: About COVID-19: [email or web address]    CDC: What to Do If You're Sick: www.cdc.gov/coronavirus/2019-ncov/about/steps-when-sick.html    CDC: Ending Home Isolation: www.cdc.gov/coronavirus/2019-ncov/hcp/disposition-in-home-patients.html     CDC: Caring for Someone: www.cdc.gov/coronavirus/2019-ncov/if-you-are-sick/care-for-someone.html     [facility]: Interim Guidance for Hospital Discharge to Home: www.health.[facility].mn.us/diseases/coronavirus/hcp/hospdischarge.pdf    [facility] clinical trials (COVID-19 research studies): clinicalaffairs.[facility]/umn-clinical-trials     Below are the COVID-19 hotlines at the [place] Department of Health ([facility]). Interpreters are available.   o For health questions: Call 513-084-7568 or 1-777.236.6507 (7  a.m. to 7 p.m.)  o For questions about schools and childcare: Call 803-937-7603 or 1-331.262.7225 (7 a.m. to 7 p.m.)     Reason for Disposition    Patient wants to be seen    Additional Information    Negative: Major bleeding (e.g., actively dripping or spurting) and can't be stopped    Negative: Amputation    Negative: Shock suspected (e.g., cold/pale/clammy skin, too weak to stand, low BP, rapid pulse)    Negative: Knife wound (or other possibly deep cut) and to chest, abdomen, back, neck, or head    Negative: Self-injury (e.g., cutting, self-harm) and suicidal or out-of-control    Negative: Sounds like a life-threatening emergency to the triager    Negative: Animal bite and broken skin    Negative: Human bite and broken skin    Negative: Puncture wound    Negative: Bleeding and won't stop after 10 minutes of direct pressure (using correct technique)    Negative: Skin is split open or gaping (or length > 1/2 inch or 12 mm on the skin, 1/4 inch or 6 mm on the face)    Negative: Deep cut and can see bone or tendons    Negative: Cut over knuckle (MCP joint)    Negative: Sensation of something in the wound (i.e., retained object in wound)    Negative: Dirt in the wound and not removed with 15 minutes of scrubbing    Negative: Wound causes numbness (i.e., loss of sensation)    Negative: Wound causes weakness (i.e., decreased ability to move hand, finger, toe)    Negative: SEVERE pain and not improved 2 hours after pain medicine    Negative: Looks infected and large red area (> 2 inches or 5 cm) or streak    Negative: Fever and spreading red area or streak    Negative: Suspicious history for the injury    Negative: Looks infected (spreading redness, pus) and no fever    Negative: No prior tetanus shots (or is not fully vaccinated)    Negative: HIV positive or severe immunodeficiency (severely weak immune system) and DIRTY cut    Protocols used: CUTS AND [id number]

## 2022-08-08 NOTE — PROGRESS NOTES
Chief Complaint   Patient presents with     Laceration     Laceration on left hand palm below thumb happened on Friday wound wont close cut with electric saw        ASSESSMENT/PLAN:  Doyle was seen today for laceration.    Diagnoses and all orders for this visit:    Laceration of left hand without foreign body, initial encounter  -     cephALEXin (KEFLEX) 500 MG capsule; Take 1 capsule (500 mg) by mouth 3 times daily for 5 days    Other orders  -     TDAP VACCINE (Adacel, Boostrix)  [[id number]]    The area was soaked in water to remove the bandage then lidocaine prilocaine cream use and that the size.  No foreign body.  Wound too late to close.  No active infection but given the location and gaping I would like to cover with antibiotics for the next few days to ensure proper healing and prevent infection.  Applied 1 Steri-Strip to add support to the wound edges but did not close wound.  Discussed wound care and dressing.    Silvio Knott PA-C      SUBJECTIVE:  Doyle is a 37 year old male who presents to urgent care with a left hand laceration.  This happened greater than 72 hours ago on an electric pole saw.  He has been bandaging it and thought it would close but it has not.  He is also been very active this last few days so that may be contributing to it.  It is quite painful.    ROS: Pertinent ROS neg other than the symptoms noted above in the HPI.     OBJECTIVE:  BP (!) 144/85   Pulse (!) 49   Temp 97.8  F (36.6  C) (Oral)   Resp 16   Wt 88.5 kg (195 lb)   SpO2 99%   BMI 24.70 kg/m     GENERAL: healthy, alert and no distress  SKIN: 1.5 cm partial-thickness laceration at base of thenar prominence of the left thumb.  Proximal portion has slightly erupted causing gaping of the wound edges.  No warmth or pus.  No significant surrounding induration or erythema    DIAGNOSTICS    No results found for any visits on 08/08/22.     No current outpatient medications on file.     No current facility-administered  medications for this visit.      Patient Active Problem List   Diagnosis     CARDIOVASCULAR SCREENING; LDL GOAL LESS THAN 160      No past medical history on file.  No past surgical history on file.  Family History   Problem Relation Age of Onset     Depression Mother      Anxiety Disorder Mother      Suicide Mother      Substance Abuse Mother      Depression Father      Anxiety Disorder Father      Bipolar Disorder Father      Substance Abuse Father      Substance Abuse Paternal Grandfather      No Known Problems Mother      No Known Problems Father      Social History     Tobacco Use     Smoking status: Never Smoker     Smokeless tobacco: Never Used   Substance Use Topics     Alcohol use: Yes     Comment: 8 drinks per week              The plan of care was discussed with the patient. They understand and agree with the course of treatment prescribed. A printed summary was given including instructions and medications.  The use of Dragon/[employer] dictation services may have been used to construct the content in this note; any grammatical or spelling errors are non-intentional. Please contact the author of this note directly if you are in need of any clarification.

## 2022-08-29 ENCOUNTER — OFFICE VISIT (OUTPATIENT)
Dept: INTERNAL MEDICINE | Facility: CLINIC | Age: 37
End: 2022-08-29
Payer: COMMERCIAL

## 2022-08-29 VITALS
OXYGEN SATURATION: 97 % | DIASTOLIC BLOOD PRESSURE: 86 MMHG | WEIGHT: 189.9 LBS | BODY MASS INDEX: 24.06 KG/M2 | HEART RATE: 70 BPM | SYSTOLIC BLOOD PRESSURE: 114 MMHG

## 2022-08-29 DIAGNOSIS — R59.9 ENLARGEMENT OF LYMPH NODE: ICD-10-CM

## 2022-08-29 DIAGNOSIS — R19.5 LOOSE STOOLS: Primary | ICD-10-CM

## 2022-08-29 DIAGNOSIS — R10.11 ABDOMINAL PAIN, RIGHT UPPER QUADRANT: ICD-10-CM

## 2022-08-29 LAB
ALBUMIN SERPL BCG-MCNC: 4.9 G/DL (ref 3.5–5.2)
ALP SERPL-CCNC: 54 U/L (ref 40–129)
ALT SERPL W P-5'-P-CCNC: 22 U/L (ref 10–50)
ANION GAP SERPL CALCULATED.3IONS-SCNC: 8 MMOL/L (ref 7–15)
AST SERPL W P-5'-P-CCNC: 29 U/L (ref 10–50)
BASOPHILS # BLD AUTO: 0 10E3/UL (ref 0–0.2)
BASOPHILS NFR BLD AUTO: 1 %
BILIRUB SERPL-MCNC: 0.8 MG/DL
BUN SERPL-MCNC: 11.5 MG/DL (ref 6–20)
CALCIUM SERPL-MCNC: 10.2 MG/DL (ref 8.6–10)
CHLORIDE SERPL-SCNC: 101 MMOL/L (ref 98–107)
CREAT SERPL-MCNC: 0.79 MG/DL (ref 0.67–1.17)
DEPRECATED HCO3 PLAS-SCNC: 28 MMOL/L (ref 22–29)
EOSINOPHIL # BLD AUTO: 0.2 10E3/UL (ref 0–0.7)
EOSINOPHIL NFR BLD AUTO: 4 %
ERYTHROCYTE [DISTWIDTH] IN BLOOD BY AUTOMATED COUNT: 11.9 % (ref 10–15)
GFR SERPL CREATININE-BSD FRML MDRD: >90 ML/MIN/1.73M2
GLUCOSE SERPL-MCNC: 103 MG/DL (ref 70–99)
HCT VFR BLD AUTO: 44.3 % (ref 40–53)
HGB BLD-MCNC: 15 G/DL (ref 13.3–17.7)
IMM GRANULOCYTES # BLD: 0 10E3/UL
IMM GRANULOCYTES NFR BLD: 0 %
LYMPHOCYTES # BLD AUTO: 2.1 10E3/UL (ref 0.8–5.3)
LYMPHOCYTES NFR BLD AUTO: 40 %
MCH RBC QN AUTO: 31.3 PG (ref 26.5–33)
MCHC RBC AUTO-ENTMCNC: 33.9 G/DL (ref 31.5–36.5)
MCV RBC AUTO: 92 FL (ref 78–100)
MONOCYTES # BLD AUTO: 0.7 10E3/UL (ref 0–1.3)
MONOCYTES NFR BLD AUTO: 13 %
NEUTROPHILS # BLD AUTO: 2.2 10E3/UL (ref 1.6–8.3)
NEUTROPHILS NFR BLD AUTO: 43 %
PLATELET # BLD AUTO: 306 10E3/UL (ref 150–450)
POTASSIUM SERPL-SCNC: 4.2 MMOL/L (ref 3.4–5.3)
PROT SERPL-MCNC: 7.4 G/DL (ref 6.4–8.3)
RBC # BLD AUTO: 4.8 10E6/UL (ref 4.4–5.9)
SODIUM SERPL-SCNC: 137 MMOL/L (ref 136–145)
WBC # BLD AUTO: 5.2 10E3/UL (ref 4–11)

## 2022-08-29 PROCEDURE — 80053 COMPREHEN METABOLIC PANEL: CPT | Performed by: INTERNAL MEDICINE

## 2022-08-29 PROCEDURE — 85025 COMPLETE CBC W/AUTO DIFF WBC: CPT | Performed by: INTERNAL MEDICINE

## 2022-08-29 PROCEDURE — 99214 OFFICE O/P EST MOD 30 MIN: CPT | Performed by: INTERNAL MEDICINE

## 2022-08-29 PROCEDURE — 36415 COLL VENOUS BLD VENIPUNCTURE: CPT | Performed by: INTERNAL MEDICINE

## 2022-08-29 NOTE — PROGRESS NOTES
Patient did not establish care with me today.    Doyle Beaver   37 year old male    Date of Visit: 8/29/2022    Chief Complaint   Patient presents with     Abdominal Pain     x9 days ago.  Initially thought it was food poisoning.  Some sharp pain.  Comes and goes.    More on RT side.  Seems like lymph nodes are swollen (x4-5 days ago).  Sore, tender and uncomfortable.  No therapies tried.  No fever.    Normal appetite.  Mild nausea.       Subjective  37-year-old male is coming in today for an acute GI illness 1 week ago.    Patient states that he was told by a doctor in [place] that he had Lyme disease and has had empiric doxycycline previously.    Patient was in his normal state of health when he went down to [place] to do some hunting 1 week ago.  On the first day he arrived he had nausea and vomiting and diarrhea for 1 day.  He was able to go hunting the next day, and felt gradually better since.    He is continue to have loose stools, not watery but on the loose side.  No blood in stool or melena.    No fever.  He has not had significant abdominal pain although does have some mild right upper quadrant tenderness when palpating liver edge today.    No new cough or sore throat or swallowing problems.  No urinary symptoms.    He did have a left hand laceration August 8 and was treated with antibiotics, Keflex.  He did not have any diarrhea at that time.  No history of C. difficile.    While down hunting he did have quite a number of chigger bites over his abdomen and arms and legs with itching.  No known tick bites.  No progressive erythematous rash.    He noticed a small lymph node in his right groin 5 days ago while working out.  He had not noticed that before.  No other lymphadenopathy.    2020 HIV negative.  Normal hemogram.  Negative hepatitis C and negative Lyme test.    There is history of anxiety and alcohol noted in the chart.  Non-smoker.    PMHx:  No past medical history on file.  PSHx:  No past  surgical history on file.  Immunizations:   Immunization History   Administered Date(s) Administered     COVID-19,PF,Pfizer (12+ Yrs) 05/20/2021, 06/10/2021     Historic Hib Hib-titer 04/28/1989     MMR 06/20/1986, 06/04/1997     TDAP Vaccine (Boostrix) 07/26/2016     Td (Adult), Adsorbed 06/04/1997     Tdap (Adacel,Boostrix) 07/05/2009, 08/08/2022       ROS A comprehensive review of systems was performed and was otherwise negative    Medications, allergies, and problem list were reviewed and updated    Exam  /86 (BP Location: Right arm, Patient Position: Sitting, Cuff Size: Adult Large)   Pulse 70   Wt 86.1 kg (189 lb 14.4 oz)   SpO2 97%   BMI 24.06 kg/m    Patient does not appear acutely ill.  There is no conjunctivitis.  Pupils are equal and reactive.  No pharyngitis.  Teeth in adequate condition.  No cervical or supraclavicular adenopathy or tenderness.  No axillary adenopathy.  Lungs are clear to auscultation with good respiratory excursion, no crackles or wheezing.  Heart is regular without murmur rub or gallop.  Abdomen is nonobese, nondistended.  There is some slight tenderness palpating the liver edge, but otherwise abdomen was nontender.  No pulsatile mass.  Uterus did feel normal and smooth.  No splenomegaly.  Skin exam shows a number of small erythematous papules with excoriations consistent with chigger bites but none of them appear to be secondarily infected.  There is just a few scattered chigger bites on the leg including the right leg.    The right groin did have a 1 cm mobile minimally tender inguinal lymph node.  There is some other shotty lymph nodes in the groin area.  But skin is normal in the groin.  No rash there.  He denies any urethral discharge or other  symptoms.    Assessment/Plan  1. Loose stools  Acute onset is I suspect this was viral gastroenteritis, now resolving.    He was given antibiotic earlier in August, however.  He is describing soft stools but not liquidy stools.   But if he does have liquid stools, he was told to bring in the C. difficile container.  He can proceed with the other stool studies.    He can try Pepto-Bismol at this time as he is improving.  If symptoms do not resolve over the next 1 to 2 weeks, he was told to follow-up with his primary care provider Dr. Cho or alternative primary care provider for follow-up.    Patient did not establish care with me today.  - Clostridium difficile Toxin B PCR; Future  - Ova and Parasite Exam Routine; Future  - Enteric Bacteria and Virus Panel by [facility]; Future    2. Enlargement of lymph node  Likely reactive lymph node from the chigger bites.  Otherwise benign to palpation he denies any  symptoms.  Patient will follow up with any further enlargement or further lymph node enlargement.  - CBC with Platelets & Differential    3. Abdominal pain, right upper quadrant  Likely palpation of the liver because some mild discomfort.  History of some alcohol use in chart.  Liver otherwise felt normal.  Checking labs today.  - Comprehensive metabolic panel  - CBC with Platelets & Differential      Return in about 2 weeks (around 9/12/2022) for Follow up if symptoms are not resolved..   Patient Instructions   Look on your [employer] computer portal for results of today's labs that were ordered.    I suspect you got a variety of the viral stomach flu, which is now resolving.  But if your symptoms do not resolve in the next 1 to 2 weeks, follow-up for further evaluation for alternative causes.    You can use Pepto-Bismol as needed for loose stools.    Yogurt or probiotic daily is recommended.    If your stool remains liquidy, you can bring a stool sample for lab evaluation.    Follow-up with Dr. Cho or your primary care provider in 2 weeks if you are not better.    If you have any further enlargement of the lymph node or additional lymph nodes that become enlarged, seek medical attention for further evaluation at that time.    Oliver Araujo,  MD, MD        No current outpatient medications on file.     No Known Allergies  Social History     Tobacco Use     Smoking status: Never Smoker     Smokeless tobacco: Never Used   Substance Use Topics     Alcohol use: Yes     Comment: 8 drinks per week     Drug use: No             Subjective   Don is a 37 year old, presenting for the following health issues:  Abdominal Pain (x9 days ago.  Initially thought it was food poisoning./Some sharp pain.  Comes and goes.  /More on RT side./Seems like lymph nodes are swollen (x4-5 days ago)./Sore, tender and uncomfortable./No therapies tried./No fever.  /Normal appetite.  Mild nausea./)      HPI           Review of Systems         Objective    /86 (BP Location: Right arm, Patient Position: Sitting, Cuff Size: Adult Large)   Pulse 70   Wt 86.1 kg (189 lb 14.4 oz)   SpO2 97%   BMI 24.06 kg/m    Body mass index is 24.06 kg/m .  Physical Exam                       .  ..

## 2022-08-29 NOTE — PATIENT INSTRUCTIONS
Look on your [employer] computer portal for results of today's labs that were ordered.    I suspect you got a variety of the viral stomach flu, which is now resolving.  But if your symptoms do not resolve in the next 1 to 2 weeks, follow-up for further evaluation for alternative causes.    You can use Pepto-Bismol as needed for loose stools.    Yogurt or probiotic daily is recommended.    If your stool remains liquidy, you can bring a stool sample for lab evaluation.    Follow-up with Dr. Cho or your primary care provider in 2 weeks if you are not better.    If you have any further enlargement of the lymph node or additional lymph nodes that become enlarged, seek medical attention for further evaluation at that time.

## 2022-11-20 ENCOUNTER — HEALTH MAINTENANCE LETTER (OUTPATIENT)
Age: 37
End: 2022-11-20

## 2023-04-15 ENCOUNTER — HEALTH MAINTENANCE LETTER (OUTPATIENT)
Age: 38
End: 2023-04-15

## 2024-06-22 ENCOUNTER — HEALTH MAINTENANCE LETTER (OUTPATIENT)
Age: 39
End: 2024-06-22

## 2025-07-12 ENCOUNTER — HEALTH MAINTENANCE LETTER (OUTPATIENT)
Age: 40
End: 2025-07-12